# Patient Record
Sex: FEMALE | Race: WHITE | NOT HISPANIC OR LATINO | Employment: OTHER | URBAN - METROPOLITAN AREA
[De-identification: names, ages, dates, MRNs, and addresses within clinical notes are randomized per-mention and may not be internally consistent; named-entity substitution may affect disease eponyms.]

---

## 2017-02-20 ENCOUNTER — OFFICE VISIT (OUTPATIENT)
Dept: INTERNAL MEDICINE | Facility: CLINIC | Age: 82
End: 2017-02-20
Payer: COMMERCIAL

## 2017-02-20 VITALS
SYSTOLIC BLOOD PRESSURE: 146 MMHG | HEIGHT: 60 IN | HEART RATE: 80 BPM | WEIGHT: 121 LBS | DIASTOLIC BLOOD PRESSURE: 82 MMHG | TEMPERATURE: 96.9 F | BODY MASS INDEX: 23.75 KG/M2 | OXYGEN SATURATION: 97 %

## 2017-02-20 DIAGNOSIS — E78.5 HYPERLIPIDEMIA LDL GOAL <130: ICD-10-CM

## 2017-02-20 DIAGNOSIS — I10 HYPERTENSION, GOAL BELOW 150/90: Primary | ICD-10-CM

## 2017-02-20 PROCEDURE — 99213 OFFICE O/P EST LOW 20 MIN: CPT | Performed by: NURSE PRACTITIONER

## 2017-02-20 RX ORDER — HYDROCHLOROTHIAZIDE 25 MG/1
12.5 TABLET ORAL EVERY MORNING
Qty: 90 TABLET | Refills: 3 | Status: SHIPPED
Start: 2017-02-20 | End: 2017-08-25

## 2017-02-20 RX ORDER — LISINOPRIL 5 MG/1
5 TABLET ORAL DAILY
Qty: 90 TABLET | Refills: 3 | Status: SHIPPED
Start: 2017-02-20 | End: 2017-07-25 | Stop reason: DRUGHIGH

## 2017-02-20 RX ORDER — PRAVASTATIN SODIUM 20 MG
20 TABLET ORAL DAILY
Qty: 90 TABLET | Refills: 3 | Status: SHIPPED
Start: 2017-02-20 | End: 2018-03-08

## 2017-02-20 ASSESSMENT — PAIN SCALES - GENERAL: PAINLEVEL: NO PAIN (0)

## 2017-02-20 NOTE — PROGRESS NOTES
"  SUBJECTIVE:                                                    Marisol Steele is a 89 year old female who presents to clinic today for the following health issues:    Medication Followup of  Blood Pressure medications    Taking Medication as prescribed: yes    Side Effects:  None    Medication Helping Symptoms:  yes     * Patient needs refill son her blood pressure medications.    *Patient would also like to establish care with you.    Lives independently in home in [place]. Feels safe at home.   Children live near by and are helpful  She cooks her own meals. Love doing yardwork  Last saw Dr. Joyner 07/2016 for annual exam. Labs stable  Talks about arthritic changes in her hands. \"Not really painful\"  Occasionally takes an aspirin. It is not bothersome to her    Reviewed family history  Hx hypertension. 1 sister had HTN and passed away from stroke  She does not check her BP at home  Denies headache, dizziness, nausea  Denies lightheadedness and symptoms of hypoTN      Problem list and histories reviewed & adjusted, as indicated.  Additional history: none    Patient Active Problem List   Diagnosis     Hyperlipidemia LDL goal <130     Advanced directives, counseling/discussion     PVD (posterior vitreous detachment), left eye     Hypertension goal BP (blood pressure) < 140/90     Pseudophakia,ou     Bilateral dry eyes     Past Surgical History   Procedure Laterality Date     Appendectomy open       Phacoemulsification with standard intraocular lens implant Right 6/29/2015     Procedure: PHACOEMULSIFICATION WITH STANDARD INTRAOCULAR LENS IMPLANT;  Surgeon: Lisette Lazaro MD;  Location: [facility]     Phacoemulsification with standard intraocular lens implant Left 8/3/2015     Procedure: PHACOEMULSIFICATION WITH STANDARD INTRAOCULAR LENS IMPLANT;  Surgeon: Lisette Lazaro MD;  Location: [facility] OR     Cataract iol, rt/lt         Social History   Substance Use Topics     Smoking status: Former Smoker     Types: " Cigarettes     Quit date: 7/6/1990     Smokeless tobacco: Never Used     Alcohol use Yes      Comment: occ wine     Family History   Problem Relation Age of Onset     Hypertension Mother      Glaucoma Mother      HEART DISEASE Father      CANCER Paternal Grandmother      HEART DISEASE Paternal Grandfather      CEREBROVASCULAR DISEASE Sister      Alcohol/Drug Son      Alcohol/Drug Sister      Respiratory Sister      Genitourinary Problems Sister      CEREBROVASCULAR DISEASE Son      Macular Degeneration No family hx of          Current Outpatient Prescriptions   Medication Sig Dispense Refill     pravastatin (PRAVACHOL) 20 MG tablet Take 1 tablet (20 mg) by mouth daily 90 tablet 3     lisinopril (PRINIVIL/ZESTRIL) 5 MG tablet Take 1 tablet (5 mg) by mouth daily 90 tablet 3     hydrochlorothiazide (HYDRODIURIL) 25 MG tablet Take 0.5 tablets (12.5 mg) by mouth every morning 90 tablet 3     ASPIRIN 81 MG PO TBEC 1 TABLET DAILY       MULTI-VITAMIN PO TABS 1 TABLET DAILY       [DISCONTINUED] pravastatin (PRAVACHOL) 20 MG tablet Take 1 tablet (20 mg) by mouth daily 90 tablet 3     [DISCONTINUED] lisinopril (PRINIVIL,ZESTRIL) 5 MG tablet Take 1 tablet (5 mg) by mouth daily 90 tablet 3     [DISCONTINUED] hydrochlorothiazide (HYDRODIURIL) 25 MG tablet Take 0.5 tablets (12.5 mg) by mouth every morning 90 tablet 3     BP Readings from Last 3 Encounters:   02/20/17 146/82   07/25/16 134/86   08/03/15 150/86    Wt Readings from Last 3 Encounters:   02/20/17 121 lb (54.9 kg)   07/25/16 121 lb (54.9 kg)   07/29/15 116 lb (52.6 kg)                  Problem list, Medication list, Allergies, and Medical/Social/Surgical histories reviewed in [facility] and updated as appropriate.    ROS:  Constitutional, HEENT, cardiovascular, pulmonary, gi and gu systems are negative, except as otherwise noted.    OBJECTIVE:                                                    /82  Pulse 80  Temp 96.9  F (36.1  C) (Oral)  Ht 5' (1.524 m)  Wt 121 lb  (54.9 kg)  SpO2 97%  BMI 23.63 kg/m2  Body mass index is 23.63 kg/(m^2).  GENERAL: healthy, alert and no distress  RESP: lungs clear to auscultation - no rales, rhonchi or wheezes  CV: regular rate and rhythm, normal S1 S2, no S3 or S4, no murmur, click or rub, no peripheral edema and peripheral pulses strong  PSYCH: mentation appears normal, affect normal/bright    Diagnostic Test Results:  none      ASSESSMENT/PLAN:                                                        ICD-10-CM    1. Hypertension, goal below 150/90 I10 lisinopril (PRINIVIL/ZESTRIL) 5 MG tablet     hydrochlorothiazide (HYDRODIURIL) 25 MG tablet   2. Hyperlipidemia LDL goal <130 E78.5 pravastatin (PRAVACHOL) 20 MG tablet       We discussed BP control. In 89 year old female w/o DM and CKD, recommend BP < 150/90. She is near this threshold. Reviewed past vitals which shows several elevated readings. We discussed the risk and benefits of increasing lisinopril to maintain tighter control of BP. Greatest risk being hypotension. She would like to stay on current dosages (given BP within goal). We will readdress this at her annual exam in July. May consider increasing lisinopril at that time.     JASPER Quintana [facility]

## 2017-02-20 NOTE — MR AVS SNAPSHOT
"              After Visit Summary   2/20/2017    Marisol Steele    MRN: [id number]           Patient Information     Date Of Birth          8/8/1926        Visit Information        Provider Department      2/20/2017 8:20 AM Gem Cabello APRN CNP [facility]        Today's Diagnoses     Hyperlipidemia LDL goal <130        Essential hypertension with goal blood pressure less than 140/90          Care Instructions    See you in the summer for your annual exam. Come fasting so we can check your cholesterol.             Follow-ups after your visit        Who to contact     If you have questions or need follow up information about today's clinic visit or your schedule please contact [facility] directly at [phone].  Normal or non-critical lab and imaging results will be communicated to you by MyChart, letter or phone within 4 business days after the clinic has received the results. If you do not hear from us within 7 days, please contact the clinic through MyChart or phone. If you have a critical or abnormal lab result, we will notify you by phone as soon as possible.  Submit refill requests through Simple Tithe or call your pharmacy and they will forward the refill request to us. Please allow 3 business days for your refill to be completed.          Additional Information About Your Visit        MyChart Information     Simple Tithe lets you send messages to your doctor, view your test results, renew your prescriptions, schedule appointments and more. To sign up, go to [email or web address] . Click on \"Log in\" on the left side of the screen, which will take you to the Welcome page. Then click on \"Sign up Now\" on the right side of the page.     You will be asked to enter the access code listed below, as well as some personal information. Please follow the directions to create your username and password.     Your access code is: [email or web address]  Expires: 5/21/2017  8:45 AM     Your " access code will  in 90 days. If you need help or a new code, please call your [place] clinic or [phone].        Care EveryWhere ID     This is your Care EveryWhere ID. This could be used by other organizations to access your [place] medical records  [id number]        Your Vitals Were     Pulse Temperature Height Pulse Oximetry BMI (Body Mass Index)       80 96.9  F (36.1  C) (Oral) 5' (1.524 m) 97% 23.63 kg/m2        Blood Pressure from Last 3 Encounters:   17 146/82   16 134/86   08/03/15 150/86    Weight from Last 3 Encounters:   17 121 lb (54.9 kg)   16 121 lb (54.9 kg)   07/29/15 116 lb (52.6 kg)              Today, you had the following     No orders found for display         Where to get your medicines      These medications were sent to [facility] YAQUELIN TREVINO  [address]  [address] [place] [address]     Phone:  [phone]     hydrochlorothiazide 25 MG tablet    lisinopril 5 MG tablet    pravastatin 20 MG tablet          Primary Care Provider Office Phone # Fax #    Pedro Joyner [phone]       [address] [facility] [place] [address]        Thank you!     Thank you for choosing [facility]  for your care. Our goal is always to provide you with excellent care. Hearing back from our patients is one way we can continue to improve our services. Please take a few minutes to complete the written survey that you may receive in the mail after your visit with us. Thank you!             Your Updated Medication List - Protect others around you: Learn how to safely use, store and throw away your medicines at www.disposemymeds.org.          This list is accurate as of: 17  8:46 AM.  Always use your most recent med list.                   Brand Name Dispense Instructions for use    aspirin 81 MG EC tablet      1 TABLET DAILY       hydrochlorothiazide 25 MG  tablet    HYDRODIURIL    90 tablet    Take 0.5 tablets (12.5 mg) by mouth every morning       lisinopril 5 MG tablet    PRINIVIL/ZESTRIL    90 tablet    Take 1 tablet (5 mg) by mouth daily       Multi-vitamin Tabs tablet   Generic drug:  multivitamin, therapeutic with minerals      1 TABLET DAILY       pravastatin 20 MG tablet    PRAVACHOL    90 tablet    Take 1 tablet (20 mg) by mouth daily

## 2017-02-20 NOTE — NURSING NOTE
Chief Complaint   Patient presents with     Recheck Medication     Establish Care       Initial /81 (BP Location: Right arm, Patient Position: Chair, Cuff Size: Adult Regular)  Pulse 80  Temp 96.9  F (36.1  C) (Oral)  Ht 5' (1.524 m)  Wt 121 lb (54.9 kg)  SpO2 97%  BMI 23.63 kg/m2 Estimated body mass index is 23.63 kg/(m^2) as calculated from the following:    Height as of this encounter: 5' (1.524 m).    Weight as of this encounter: 121 lb (54.9 kg).  Medication Reconciliation: complete   Fariha Maciel MA

## 2017-07-25 ENCOUNTER — OFFICE VISIT (OUTPATIENT)
Dept: FAMILY MEDICINE | Facility: CLINIC | Age: 82
End: 2017-07-25
Payer: COMMERCIAL

## 2017-07-25 VITALS
HEART RATE: 84 BPM | TEMPERATURE: 97.3 F | OXYGEN SATURATION: 98 % | SYSTOLIC BLOOD PRESSURE: 148 MMHG | BODY MASS INDEX: 20.9 KG/M2 | DIASTOLIC BLOOD PRESSURE: 68 MMHG | WEIGHT: 107 LBS

## 2017-07-25 DIAGNOSIS — E78.5 HYPERLIPIDEMIA LDL GOAL <130: ICD-10-CM

## 2017-07-25 DIAGNOSIS — I10 HYPERTENSION, GOAL BELOW 150/90: Primary | ICD-10-CM

## 2017-07-25 LAB
ANION GAP SERPL CALCULATED.3IONS-SCNC: 8 MMOL/L (ref 3–14)
BUN SERPL-MCNC: 11 MG/DL (ref 7–30)
CALCIUM SERPL-MCNC: 9.6 MG/DL (ref 8.5–10.1)
CHLORIDE SERPL-SCNC: 104 MMOL/L (ref 94–109)
CHOLEST SERPL-MCNC: 194 MG/DL
CO2 SERPL-SCNC: 27 MMOL/L (ref 20–32)
CREAT SERPL-MCNC: 0.76 MG/DL (ref 0.52–1.04)
GFR SERPL CREATININE-BSD FRML MDRD: 71 ML/MIN/1.7M2
GLUCOSE SERPL-MCNC: 80 MG/DL (ref 70–99)
HDLC SERPL-MCNC: 74 MG/DL
LDLC SERPL CALC-MCNC: 97 MG/DL
NONHDLC SERPL-MCNC: 120 MG/DL
POTASSIUM SERPL-SCNC: 3.5 MMOL/L (ref 3.4–5.3)
SODIUM SERPL-SCNC: 139 MMOL/L (ref 133–144)
TRIGL SERPL-MCNC: 117 MG/DL

## 2017-07-25 PROCEDURE — 80048 BASIC METABOLIC PNL TOTAL CA: CPT | Performed by: NURSE PRACTITIONER

## 2017-07-25 PROCEDURE — 80061 LIPID PANEL: CPT | Performed by: NURSE PRACTITIONER

## 2017-07-25 PROCEDURE — 36415 COLL VENOUS BLD VENIPUNCTURE: CPT | Performed by: NURSE PRACTITIONER

## 2017-07-25 PROCEDURE — 99213 OFFICE O/P EST LOW 20 MIN: CPT | Performed by: NURSE PRACTITIONER

## 2017-07-25 RX ORDER — LISINOPRIL 10 MG/1
10 TABLET ORAL DAILY
Qty: 90 TABLET | Refills: 3 | Status: SHIPPED | OUTPATIENT
Start: 2017-07-25 | End: 2017-08-01 | Stop reason: DRUGHIGH

## 2017-07-25 ASSESSMENT — PAIN SCALES - GENERAL: PAINLEVEL: NO PAIN (0)

## 2017-07-25 NOTE — PATIENT INSTRUCTIONS
I increased your lisinopril 10 mg daily. You can take 2 of your 5 mg tablets until your bottle is empty. Then call your [facility] pharmacy to  your new prescription.     Follow up with the nurse in 1 week

## 2017-07-25 NOTE — LETTER
[facility]  [address] [place]  [address]  [phone]        July 27, 2017    Marisol Steele  [address]  [place] [address]        Dear Marisol,    The results of your recent labs are enclosed.  Your labs look great!  I hope your blood pressure is improved with the increase in dosage. I'm glad you have an appointment with the nurse for a recheck next week.     Please call the clinic if you have any concerns.    Results for orders placed or performed in visit on 07/25/17   Basic metabolic panel   Result Value Ref Range    Sodium 139 133 - 144 mmol/L    Potassium 3.5 3.4 - 5.3 mmol/L    Chloride 104 94 - 109 mmol/L    Carbon Dioxide 27 20 - 32 mmol/L    Anion Gap 8 3 - 14 mmol/L    Glucose 80 70 - 99 mg/dL    Urea Nitrogen 11 7 - 30 mg/dL    Creatinine 0.76 0.52 - 1.04 mg/dL    GFR Estimate 71 >60 mL/min/1.7m2    GFR Estimate If Black 86 >60 mL/min/1.7m2    Calcium 9.6 8.5 - 10.1 mg/dL   Lipid Profile (Chol, Trig, HDL, LDL calc)   Result Value Ref Range    Cholesterol 194 <200 mg/dL    Triglycerides 117 <150 mg/dL    HDL Cholesterol 74 >49 mg/dL    LDL Cholesterol Calculated 97 <100 mg/dL    Non HDL Cholesterol 120 <130 mg/dL   If you have any questions please call the clinic at [phone].    Sincerely,    Gem HUTCHINSON CNP  LMD

## 2017-07-25 NOTE — NURSING NOTE
Chief Complaint   Patient presents with     Hypertension       Initial /89 (BP Location: Right arm, Patient Position: Chair, Cuff Size: Adult Small)  Pulse 84  Temp 97.3  F (36.3  C) (Oral)  Wt 107 lb (48.5 kg)  SpO2 98%  Breastfeeding? No  BMI 20.9 kg/m2 Estimated body mass index is 20.9 kg/(m^2) as calculated from the following:    Height as of 2/20/17: 5' (1.524 m).    Weight as of this encounter: 107 lb (48.5 kg).  Medication Reconciliation: complete.  SUHA Olguin MA

## 2017-07-25 NOTE — MR AVS SNAPSHOT
After Visit Summary   7/25/2017    Marisol Steele    MRN: [id number]           Patient Information     Date Of Birth          8/8/1926        Visit Information        Provider Department      7/25/2017 11:40 AM Gem Cabello APRN CNP [facility]        Today's Diagnoses     Hypertension, goal below 150/90    -  1    Hyperlipidemia LDL goal <130          Care Instructions    I increased your lisinopril 10 mg daily. You can take 2 of your 5 mg tablets until your bottle is empty. Then call your [facility] pharmacy to  your new prescription.     Follow up with the nurse in 1 week          Follow-ups after your visit        Your next 10 appointments already scheduled     Aug 01, 2017  9:00 AM CDT   Nurse Only with CP RN   [facility] ([facility])    [address] [place] [address] [phone]           [facility] need to be scheduled for 60 mins              Who to contact     If you have questions or need follow up information about today's clinic visit or your schedule please contact [facility] directly at [phone].  Normal or non-critical lab and imaging results will be communicated to you by Qubellhart, letter or phone within 4 business days after the clinic has received the results. If you do not hear from us within 7 days, please contact the clinic through Qubellhart or phone. If you have a critical or abnormal lab result, we will notify you by phone as soon as possible.  Submit refill requests through Postmaster or call your pharmacy and they will forward the refill request to us. Please allow 3 business days for your refill to be completed.          Additional Information About Your Visit        QubellharMaternova Information     Postmaster lets you send messages to your doctor, view your test results, renew your prescriptions, schedule appointments and more. To sign up, go to  "[email or web address] . Click on \"Log in\" on the left side of the screen, which will take you to the Welcome page. Then click on \"Sign up Now\" on the right side of the page.     You will be asked to enter the access code listed below, as well as some personal information. Please follow the directions to create your username and password.     Your access code is: [email or web address]  Expires: 10/23/2017 12:05 PM     Your access code will  in 90 days. If you need help or a new code, please call your [place] clinic or [phone].        Care EveryWhere ID     This is your Care EveryWhere ID. This could be used by other organizations to access your [place] medical records  [id number]        Your Vitals Were     Pulse Temperature Pulse Oximetry Breastfeeding? BMI (Body Mass Index)       84 97.3  F (36.3  C) (Oral) 98% No 20.9 kg/m2        Blood Pressure from Last 3 Encounters:   17 148/68   17 146/82   16 134/86    Weight from Last 3 Encounters:   17 107 lb (48.5 kg)   17 121 lb (54.9 kg)   16 121 lb (54.9 kg)              We Performed the Following     Basic metabolic panel     Lipid Profile (Chol, Trig, HDL, LDL calc)          Today's Medication Changes          These changes are accurate as of: 17 12:05 PM.  If you have any questions, ask your nurse or doctor.               These medicines have changed or have updated prescriptions.        Dose/Directions    lisinopril 10 MG tablet   Commonly known as:  PRINIVIL/ZESTRIL   This may have changed:    - medication strength  - how much to take   Used for:  Hypertension, goal below 150/90   Changed by:  Gem Cabello APRN CNP        Dose:  10 mg   Take 1 tablet (10 mg) by mouth daily   Quantity:  90 tablet   Refills:  3            Where to get your medicines      These medications were sent to [facility] YAQUELIN TREVION  [address]  [address] [address]     Phone:  " [phone]     lisinopril 10 MG tablet                Primary Care Provider Office Phone # Fax #    Pedro Joyner [phone]       [facility] [address] [facility] [address]        Equal Access to Services     KADIE RACHEL : Hadii aad ku hadasho Soomaali, waaxda luqadaha, qaybta kaalmada adeegyada, waxay idiin hayaan adetomi jenkins lakimberly whitaker. So [facility] [phone].    ATENCIÓN: Si habla español, tiene a phan disposición servicios gratuitos de asistencia lingüística. Llame al [phone].    We comply with applicable federal civil rights laws and [place] laws. We do not discriminate on the basis of race, color, national origin, age, disability sex, sexual orientation or gender identity.            Thank you!     Thank you for choosing [facility]  for your care. Our goal is always to provide you with excellent care. Hearing back from our patients is one way we can continue to improve our services. Please take a few minutes to complete the written survey that you may receive in the mail after your visit with us. Thank you!             Your Updated Medication List - Protect others around you: Learn how to safely use, store and throw away your medicines at www.disposemymeds.org.          This list is accurate as of: 7/25/17 12:05 PM.  Always use your most recent med list.                   Brand Name Dispense Instructions for use Diagnosis    aspirin 81 MG EC tablet      1 TABLET DAILY        hydrochlorothiazide 25 MG tablet    HYDRODIURIL    90 tablet    Take 0.5 tablets (12.5 mg) by mouth every morning    Hypertension, goal below 150/90       lisinopril 10 MG tablet    PRINIVIL/ZESTRIL    90 tablet    Take 1 tablet (10 mg) by mouth daily    Hypertension, goal below 150/90       Multi-vitamin Tabs tablet   Generic drug:  multivitamin, therapeutic with minerals      1 TABLET DAILY        pravastatin 20 MG tablet    PRAVACHOL    90 tablet    Take 1  tablet (20 mg) by mouth daily    Hyperlipidemia LDL goal <130

## 2017-07-25 NOTE — PROGRESS NOTES
SUBJECTIVE:                                                    Marisol Steele is a 89 year old female who presents to clinic today for the following health issues:      Hypertension Follow-up      Outpatient blood pressures are not being checked.    Low Salt Diet: low salt        Amount of exercise or physical activity: 2-3 days/week for an average of 15-30 minutes    Problems taking medications regularly: No    Medication side effects: none  Diet: low salt and carbohydrate counting    She does not check her BP at home  Denies headache, dizziness, nausea  Denies lightheadedness and symptoms of hypoTN    Discussed DEXA scan which she continues to decline      Problem list and histories reviewed & adjusted, as indicated.  Additional history: none    Patient Active Problem List   Diagnosis     Hyperlipidemia LDL goal <130     Advanced directives, counseling/discussion     PVD (posterior vitreous detachment), left eye     Hypertension, goal below 150/90     Pseudophakia,ou     Bilateral dry eyes     Past Surgical History:   Procedure Laterality Date     APPENDECTOMY OPEN       CATARACT IOL, RT/LT       PHACOEMULSIFICATION WITH STANDARD INTRAOCULAR LENS IMPLANT Right 6/29/2015    Procedure: PHACOEMULSIFICATION WITH STANDARD INTRAOCULAR LENS IMPLANT;  Surgeon: Lisette Lazaro MD;  Location: [facility]     PHACOEMULSIFICATION WITH STANDARD INTRAOCULAR LENS IMPLANT Left 8/3/2015    Procedure: PHACOEMULSIFICATION WITH STANDARD INTRAOCULAR LENS IMPLANT;  Surgeon: Lisette Lazaro MD;  Location: [facility] OR       Social History   Substance Use Topics     Smoking status: Former Smoker     Types: Cigarettes     Quit date: 7/6/1990     Smokeless tobacco: Never Used     Alcohol use Yes      Comment: occ wine     Family History   Problem Relation Age of Onset     Hypertension Mother      Glaucoma Mother      HEART DISEASE Father      CANCER Paternal Grandmother      HEART DISEASE Paternal Grandfather      CEREBROVASCULAR DISEASE  Sister      Alcohol/Drug Son      Alcohol/Drug Sister      Respiratory Sister      Genitourinary Problems Sister      CEREBROVASCULAR DISEASE Son      Hypertension Sister      CEREBROVASCULAR DISEASE Sister      Macular Degeneration No family hx of          BP Readings from Last 3 Encounters:   07/25/17 148/68   02/20/17 146/82   07/25/16 134/86    Wt Readings from Last 3 Encounters:   07/25/17 107 lb (48.5 kg)   02/20/17 121 lb (54.9 kg)   07/25/16 121 lb (54.9 kg)                        Reviewed and updated as needed this visit by clinical staffTobacco  Allergies  Meds  Med Hx  Surg Hx  Fam Hx  Soc Hx      Reviewed and updated as needed this visit by Provider         ROS:  Constitutional, HEENT, cardiovascular, pulmonary, gi and gu systems are negative, except as otherwise noted.      OBJECTIVE:   /68  Pulse 84  Temp 97.3  F (36.3  C) (Oral)  Wt 107 lb (48.5 kg)  SpO2 98%  Breastfeeding? No  BMI 20.9 kg/m2  Body mass index is 20.9 kg/(m^2).  GENERAL: healthy, alert and no distress  RESP: lungs clear to auscultation - no rales, rhonchi or wheezes  CV: regular rate and rhythm, normal S1 S2, no S3 or S4, no murmur, click or rub, no peripheral edema and peripheral pulses strong  ABDOMEN: soft, nontender, no hepatosplenomegaly, no masses and bowel sounds normal    Diagnostic Test Results:  none   BP Readings from Last 3 Encounters:   07/25/17 148/68   02/20/17 146/82   07/25/16 134/86       ASSESSMENT/PLAN:       ICD-10-CM    1. Hypertension, goal below 150/90 I10 Basic metabolic panel     lisinopril (PRINIVIL/ZESTRIL) 10 MG tablet   2. Hyperlipidemia LDL goal <130 E78.5 Lipid Profile (Chol, Trig, HDL, LDL calc)     BP improved upon recheck, but given first 2 readings above goal 150/90, especially first SBP in 180s, recommend stricter BP control  Cautious about causing hypotension  Follow up in 1 week    Patient Instructions   I increased your lisinopril 10 mg daily. You can take 2 of your 5 mg  tablets until your bottle is empty. Then call your [facility] pharmacy to  your new prescription.     Follow up with the nurse in 1 week      JASPER Quintana [facility]

## 2017-07-27 NOTE — PROGRESS NOTES
[address] [address]  Phone: [phone]  Fax: [phone]      07/27/17    Marisol Steele  [address] [place] [address]  [place] [address]      Dear Marisol,    The results of your recent labs are enclosed.  Your labs look great!  I hope your blood pressure is improved with the increase in dosage. I'm glad you have an appointment with the nurse for a recheck next week.     Please call the clinic if you have any concerns.    Sincerely,    JASPER Quintana CNP    Your [facility] Care Team

## 2017-08-01 ENCOUNTER — ALLIED HEALTH/NURSE VISIT (OUTPATIENT)
Dept: NURSING | Facility: CLINIC | Age: 82
End: 2017-08-01
Payer: COMMERCIAL

## 2017-08-01 ENCOUNTER — TELEPHONE (OUTPATIENT)
Dept: FAMILY MEDICINE | Facility: CLINIC | Age: 82
End: 2017-08-01

## 2017-08-01 VITALS
WEIGHT: 109 LBS | BODY MASS INDEX: 21.29 KG/M2 | SYSTOLIC BLOOD PRESSURE: 168 MMHG | DIASTOLIC BLOOD PRESSURE: 88 MMHG | HEART RATE: 68 BPM

## 2017-08-01 DIAGNOSIS — I10 HYPERTENSION, GOAL BELOW 150/90: Primary | ICD-10-CM

## 2017-08-01 PROCEDURE — 99207 ZZC NO CHARGE NURSE ONLY: CPT

## 2017-08-01 RX ORDER — LISINOPRIL 20 MG/1
20 TABLET ORAL DAILY
Qty: 30 TABLET | Refills: 1 | Status: SHIPPED | OUTPATIENT
Start: 2017-08-01 | End: 2017-08-10

## 2017-08-01 NOTE — PROGRESS NOTES
Indications for Blood Pressure monitoring: initially elevated at office visit a week ago, increased lisinopril dose a week ago    Questioned patient about current smoking habits.  Pt. quit smoking some time ago.     Signs and Symptoms:   Headaches: No  Chest pain: No  Shortness of breath: No  Edema: No  Visual problems: No  Parathesia: No  Epitaxis: No  Dizziness: No    Patient does not check BP at home, denies she knows anyone with home BP cuff.   States she does feel herself get anxious before coming into clinic.      BP:   BP Readings from Last 1 Encounters:   08/01/17 168/88     68     Diabetic: No  Heart Disease:  No    Phone encounter routed to Gem Cabello to advise on any changes or plan.    Zaira Hayes RN  [facility]

## 2017-08-01 NOTE — MR AVS SNAPSHOT
"              After Visit Summary   8/1/2017    Marisol Steele    MRN: [id number]           Patient Information     Date Of Birth          8/8/1926        Visit Information        Provider Department      8/1/2017 9:00 AM CP RN [facility]        Care Instructions    Gem's team will let you know if she wants to adjust anything or see you again regarding your blood pressure.   Call if you have concerns or develop any symptoms such as dizziness, excessive fatigue, shortness of breath, blurry vision.    Have a happy birthday!          Follow-ups after your visit        Who to contact     If you have questions or need follow up information about today's clinic visit or your schedule please contact [facility] directly at [phone].  Normal or non-critical lab and imaging results will be communicated to you by MyChart, letter or phone within 4 business days after the clinic has received the results. If you do not hear from us within 7 days, please contact the clinic through [employer] or phone. If you have a critical or abnormal lab result, we will notify you by phone as soon as possible.  Submit refill requests through [employer] or call your pharmacy and they will forward the refill request to us. Please allow 3 business days for your refill to be completed.          Additional Information About Your Visit        MyChart Information     [employer] lets you send messages to your doctor, view your test results, renew your prescriptions, schedule appointments and more. To sign up, go to [email or web address] . Click on \"Log in\" on the left side of the screen, which will take you to the Welcome page. Then click on \"Sign up Now\" on the right side of the page.     You will be asked to enter the access code listed below, as well as some personal information. Please follow the directions to create your username and password.     Your access code is: [email or web address]  Expires: 10/23/2017 " 12:05 PM     Your access code will  in 90 days. If you need help or a new code, please call your [place] clinic or [phone].        Care EveryWhere ID     This is your Care EveryWhere ID. This could be used by other organizations to access your [place] medical records  [id number]        Your Vitals Were     Pulse BMI (Body Mass Index)                68 21.29 kg/m2           Blood Pressure from Last 3 Encounters:   17 168/88   17 148/68   17 146/82    Weight from Last 3 Encounters:   17 109 lb (49.4 kg)   17 107 lb (48.5 kg)   17 121 lb (54.9 kg)              Today, you had the following     No orders found for display       Primary Care Provider Office Phone # Fax JASPER Suarez [facility] [phone]       [facility] [address]  [facility] [address]        Equal Access to Services     KADIE RACHEL : Hadii aad ku hadasho Soomaali, waaxda luqadaha, qaybta kaalmada adeegyada, waxay idiin hayaan adeeg kharaduarte blanco . So [facility] [phone].    ATENCIÓN: Si habla español, tiene a phan disposición servicios gratuitos de asistencia lingüística. Llame al [phone].    We comply with applicable federal civil rights laws and [place] laws. We do not discriminate on the basis of race, color, national origin, age, disability sex, sexual orientation or gender identity.            Thank you!     Thank you for choosing [facility]  for your care. Our goal is always to provide you with excellent care. Hearing back from our patients is one way we can continue to improve our services. Please take a few minutes to complete the written survey that you may receive in the mail after your visit with us. Thank you!             Your Updated Medication List - Protect others around you: Learn how to safely use, store and throw away your medicines at www.disposemymeds.org.          This list is accurate as  of: 8/1/17  9:17 AM.  Always use your most recent med list.                   Brand Name Dispense Instructions for use Diagnosis    aspirin 81 MG EC tablet      1 TABLET DAILY        hydrochlorothiazide 25 MG tablet    HYDRODIURIL    90 tablet    Take 0.5 tablets (12.5 mg) by mouth every morning    Hypertension, goal below 150/90       lisinopril 10 MG tablet    PRINIVIL/ZESTRIL    90 tablet    Take 1 tablet (10 mg) by mouth daily    Hypertension, goal below 150/90       Multi-vitamin Tabs tablet   Generic drug:  multivitamin, therapeutic with minerals      1 TABLET DAILY        pravastatin 20 MG tablet    PRAVACHOL    90 tablet    Take 1 tablet (20 mg) by mouth daily    Hyperlipidemia LDL goal <130

## 2017-08-01 NOTE — TELEPHONE ENCOUNTER
I increased her lisinopril to 20 mg. I sent the prescription to [facility] in [place]. She will need to call when she wants it filled. If she still has 5 mg tablets remaining, she can take 4 at a time. If she is using the 10 mg tablets, she can 2 tablets at a time. Total dose should be 20 mg once daily.   Have her return for BP and lab visit in 1 week. Make sure she can sit and rest for 5 minutes before checking blood pressure and check manually. I am cautious about causing hypotension.   She can continue to see the nurse, or can schedule a visit with me.

## 2017-08-01 NOTE — PATIENT INSTRUCTIONS
Gem's team will let you know if she wants to adjust anything or see you again regarding your blood pressure.   Call if you have concerns or develop any symptoms such as dizziness, excessive fatigue, shortness of breath, blurry vision.    Have a happy birthday!

## 2017-08-01 NOTE — TELEPHONE ENCOUNTER
Patient was seen today for RN BP check; lisinopril dose increased a week ago.  States she took her lisinopril and hctz about 2 hours ago today.    Denies any symptoms.  Checked BP several times at today's visit.    BP Readings from Last 3 Encounters:   08/01/17 168/88   07/25/17 148/68   02/20/17 146/82     Patient does not check BP at home, denies she knows anyone with home BP cuff.   States she does feel herself get anxious before coming into clinic.    Routed to Gem Cabello to address any change to meds or plan at this point.    Zaira Hayes RN  [facility]

## 2017-08-01 NOTE — TELEPHONE ENCOUNTER
Called and spoke with patient, advised of message as below.  She currently has the 5mg at home, she will continue that (taking 4 pills for a total of 20mg) until she runs out.  Scheduled BP recheck and labs next week 8/8/17 at 130pm.    Norah Jansen RN  [facility]

## 2017-08-08 ENCOUNTER — TELEPHONE (OUTPATIENT)
Dept: FAMILY MEDICINE | Facility: CLINIC | Age: 82
End: 2017-08-08

## 2017-08-08 ENCOUNTER — ALLIED HEALTH/NURSE VISIT (OUTPATIENT)
Dept: NURSING | Facility: CLINIC | Age: 82
End: 2017-08-08
Payer: COMMERCIAL

## 2017-08-08 VITALS
BODY MASS INDEX: 21.09 KG/M2 | DIASTOLIC BLOOD PRESSURE: 87 MMHG | SYSTOLIC BLOOD PRESSURE: 175 MMHG | HEART RATE: 73 BPM | WEIGHT: 108 LBS

## 2017-08-08 DIAGNOSIS — I10 HYPERTENSION, GOAL BELOW 150/90: ICD-10-CM

## 2017-08-08 LAB
ANION GAP SERPL CALCULATED.3IONS-SCNC: 7 MMOL/L (ref 3–14)
BUN SERPL-MCNC: 14 MG/DL (ref 7–30)
CALCIUM SERPL-MCNC: 9.3 MG/DL (ref 8.5–10.1)
CHLORIDE SERPL-SCNC: 101 MMOL/L (ref 94–109)
CO2 SERPL-SCNC: 27 MMOL/L (ref 20–32)
CREAT SERPL-MCNC: 0.82 MG/DL (ref 0.52–1.04)
GFR SERPL CREATININE-BSD FRML MDRD: 65 ML/MIN/1.7M2
GLUCOSE SERPL-MCNC: 83 MG/DL (ref 70–99)
POTASSIUM SERPL-SCNC: 3.5 MMOL/L (ref 3.4–5.3)
SODIUM SERPL-SCNC: 135 MMOL/L (ref 133–144)

## 2017-08-08 PROCEDURE — 36415 COLL VENOUS BLD VENIPUNCTURE: CPT | Performed by: NURSE PRACTITIONER

## 2017-08-08 PROCEDURE — 99207 ZZC NO CHARGE NURSE ONLY: CPT

## 2017-08-08 PROCEDURE — 80048 BASIC METABOLIC PNL TOTAL CA: CPT | Performed by: NURSE PRACTITIONER

## 2017-08-08 NOTE — TELEPHONE ENCOUNTER
Attempted to call patient at home number, left message on answering service requesting call back to clinic to discuss.  Zaira Hayes RN  [facility]

## 2017-08-08 NOTE — PATIENT INSTRUCTIONS
Gem's team will call you after the blood test result is back tomorrow (Wednesday) or later.    Gem will let you know if she wants to try something different.

## 2017-08-08 NOTE — TELEPHONE ENCOUNTER
"Patient was seen today by RN for BP check, lisinopril dose increased to 20 mg 8/1/17.    BP Readings from Last 3 Encounters:   08/08/17 175/87   08/01/17 168/88   07/25/17 148/68     Patient denies eating a lot of salty food this weekend (she had a birthday party over the weekend, today is her birthday) and states she was taking the 4 tablets of her 5 mg lisinopril up until today when she started the supply of 20 mg lisinopril tablets.   Denies she missed any doses and took all her meds this AM.    Reports very occasional dry cough, not bothersome, not keeping her up at night.  Otherwise no symptoms of high or low BP reported.     She has not checked BP anywhere outside of clinic (such as in a pharmacy).  Today I checked BP twice manually and once with clinic \"tower\" to verify readings, all still elevated/unchanged and actually a bit higher than final reading at last RN visit.    She states she thinks she gets anxious getting BP checked in clinic.  Future order for BMP released, patient to lab.    Routed to Gem Cabello to facilitate follow up regarding lab results and plan for BP.    Zaira Hayes RN  [facility]      "

## 2017-08-08 NOTE — MR AVS SNAPSHOT
"              After Visit Summary   8/8/2017    Marisol Steele    MRN: [id number]           Patient Information     Date Of Birth          8/8/1926        Visit Information        Provider Department      8/8/2017 1:30 PM CP RN [facility]        Today's Diagnoses     Hypertension, goal below 150/90          Care Instructions    Gem's team will call you after the blood test result is back tomorrow (Wednesday) or later.    Gem will let you know if she wants to try something different.          Follow-ups after your visit        Who to contact     If you have questions or need follow up information about today's clinic visit or your schedule please contact [facility] directly at [phone].  Normal or non-critical lab and imaging results will be communicated to you by MyChart, letter or phone within 4 business days after the clinic has received the results. If you do not hear from us within 7 days, please contact the clinic through [employer] or phone. If you have a critical or abnormal lab result, we will notify you by phone as soon as possible.  Submit refill requests through [employer] or call your pharmacy and they will forward the refill request to us. Please allow 3 business days for your refill to be completed.          Additional Information About Your Visit        MyChart Information     [employer] lets you send messages to your doctor, view your test results, renew your prescriptions, schedule appointments and more. To sign up, go to [email or web address] . Click on \"Log in\" on the left side of the screen, which will take you to the Welcome page. Then click on \"Sign up Now\" on the right side of the page.     You will be asked to enter the access code listed below, as well as some personal information. Please follow the directions to create your username and password.     Your access code is: [email or web address]  Expires: 10/23/2017 12:05 PM     Your access code will "  in 90 days. If you need help or a new code, please call your [place] clinic or [phone].        Care EveryWhere ID     This is your Care EveryWhere ID. This could be used by other organizations to access your [place] medical records  [id number]        Your Vitals Were     Pulse BMI (Body Mass Index)                73 21.09 kg/m2           Blood Pressure from Last 3 Encounters:   17 175/87   17 168/88   17 148/68    Weight from Last 3 Encounters:   17 108 lb (49 kg)   17 109 lb (49.4 kg)   17 107 lb (48.5 kg)              We Performed the Following     **Basic metabolic panel FUTURE 2mo        Primary Care Provider Office Phone # Fax #    Gem Llanos JASPER Cabello [phone]       [facility] [address] [facility] [address]        Equal Access to Services     KADIE RACHEL : Hadii aad ku hadasho Soomaali, waaxda luqadaha, qaybta kaalmada adeegyada, waxay idiin hayaan dandreeg innaaraduarte blanco . So [facility] [phone].    ATENCIÓN: Si habla español, tiene a phan disposición servicios gratuitos de asistencia lingüística. Llame al [phone].    We comply with applicable federal civil rights laws and [place] laws. We do not discriminate on the basis of race, color, national origin, age, disability sex, sexual orientation or gender identity.            Thank you!     Thank you for choosing [facility]  for your care. Our goal is always to provide you with excellent care. Hearing back from our patients is one way we can continue to improve our services. Please take a few minutes to complete the written survey that you may receive in the mail after your visit with us. Thank you!             Your Updated Medication List - Protect others around you: Learn how to safely use, store and throw away your medicines at www.disposemymeds.org.          This list is accurate as of: 17  1:37 PM.  Always use  your most recent med list.                   Brand Name Dispense Instructions for use Diagnosis    aspirin 81 MG EC tablet      1 TABLET DAILY        hydrochlorothiazide 25 MG tablet    HYDRODIURIL    90 tablet    Take 0.5 tablets (12.5 mg) by mouth every morning    Hypertension, goal below 150/90       lisinopril 20 MG tablet    PRINIVIL/ZESTRIL    30 tablet    Take 1 tablet (20 mg) by mouth daily    Hypertension, goal below 150/90       Multi-vitamin Tabs tablet   Generic drug:  multivitamin, therapeutic with minerals      1 TABLET DAILY        pravastatin 20 MG tablet    PRAVACHOL    90 tablet    Take 1 tablet (20 mg) by mouth daily    Hyperlipidemia LDL goal <130

## 2017-08-08 NOTE — PROGRESS NOTES
"Indications for Blood Pressure monitoring: elevated, increasing lisinopril    Questioned patient about current smoking habits.  Pt. quit smoking some time ago.     Signs and Symptoms:   Headaches: No  Chest pain: No  Shortness of breath: No  Edema: No  Visual problems: No  Parathesia: No  Epitaxis: No  Dizziness: No    Reports very occasional dry cough, not bothersome, not keeping her up at night.    She has not checked BP anywhere outside of clinic (such as in a pharmacy).   Checked BP twice manually and once with clinic \"tower\" to verify readings, all still elevated/unchanged and actually a bit higher than final reading at last RN visit.    Future order for BMP released, patient to lab.      BP:   BP Readings from Last 1 Encounters:   08/08/17 175/87     73     Diabetic: No  Heart Disease:  No    Patient denies eating a lot of salty food this weekend (she had a birthday party over the weekend, today is her birthday) and states she was taking the 4 tablets of her 5 mg lisinopril up until today when she started the supply of 20 mg lisinopril tablets.   Denies she missed any doses and took all her meds this AM.    Routed phone encounter to Gem Cabello to facilitate follow up on BMP result and plan.    Zaira Hayes RN  [facility]                        "

## 2017-08-09 NOTE — TELEPHONE ENCOUNTER
Patient returned call to RN Triage line, left message to call her back.      Norah Jansen RN  [facility]

## 2017-08-09 NOTE — TELEPHONE ENCOUNTER
RN called patient, scheduled for 8/10 at 1020am with AWILDA.      Norah Jansen RN  [facility]

## 2017-08-10 ENCOUNTER — OFFICE VISIT (OUTPATIENT)
Dept: FAMILY MEDICINE | Facility: CLINIC | Age: 82
End: 2017-08-10
Payer: COMMERCIAL

## 2017-08-10 VITALS
HEART RATE: 87 BPM | WEIGHT: 107 LBS | TEMPERATURE: 97 F | BODY MASS INDEX: 21.01 KG/M2 | HEIGHT: 60 IN | OXYGEN SATURATION: 97 % | SYSTOLIC BLOOD PRESSURE: 172 MMHG | DIASTOLIC BLOOD PRESSURE: 84 MMHG

## 2017-08-10 DIAGNOSIS — I10 HYPERTENSION, GOAL BELOW 150/90: Primary | ICD-10-CM

## 2017-08-10 PROCEDURE — 99213 OFFICE O/P EST LOW 20 MIN: CPT | Performed by: NURSE PRACTITIONER

## 2017-08-10 RX ORDER — AMLODIPINE BESYLATE 2.5 MG/1
2.5 TABLET ORAL DAILY
Qty: 30 TABLET | Refills: 1 | Status: SHIPPED | OUTPATIENT
Start: 2017-08-10 | End: 2017-08-25 | Stop reason: DRUGHIGH

## 2017-08-10 ASSESSMENT — PAIN SCALES - GENERAL: PAINLEVEL: NO PAIN (0)

## 2017-08-10 NOTE — NURSING NOTE
Chief Complaint   Patient presents with     Hypertension       Initial BP (!) 181/98 (BP Location: Right arm, Patient Position: Chair, Cuff Size: Adult Small)  Pulse 87  Temp 97  F (36.1  C) (Oral)  Ht 5' (1.524 m)  Wt 107 lb (48.5 kg)  SpO2 97%  BMI 20.9 kg/m2 Estimated body mass index is 20.9 kg/(m^2) as calculated from the following:    Height as of this encounter: 5' (1.524 m).    Weight as of this encounter: 107 lb (48.5 kg).  Medication Reconciliation: complete   Fariha Maciel MA

## 2017-08-10 NOTE — MR AVS SNAPSHOT
"              After Visit Summary   8/10/2017    Marisol Steele    MRN: [id number]           Patient Information     Date Of Birth          8/8/1926        Visit Information        Provider Department      8/10/2017 10:20 AM Gem Cabello APRN CNP [facility]        Today's Diagnoses     Hypertension, goal below 150/90    -  1      Care Instructions    Stop taking lisinopril. I don't think it's effective for you.  Start taking a new medication, called amlodipine.  Follow up in 1-2 weeks. If you develop edema, or other side effects, call the clinic ([phone])          Follow-ups after your visit        Who to contact     If you have questions or need follow up information about today's clinic visit or your schedule please contact [facility] directly at [phone].  Normal or non-critical lab and imaging results will be communicated to you by MyChart, letter or phone within 4 business days after the clinic has received the results. If you do not hear from us within 7 days, please contact the clinic through MyChart or phone. If you have a critical or abnormal lab result, we will notify you by phone as soon as possible.  Submit refill requests through [employer] or call your pharmacy and they will forward the refill request to us. Please allow 3 business days for your refill to be completed.          Additional Information About Your Visit        MyChart Information     [employer] lets you send messages to your doctor, view your test results, renew your prescriptions, schedule appointments and more. To sign up, go to [email or web address] . Click on \"Log in\" on the left side of the screen, which will take you to the Welcome page. Then click on \"Sign up Now\" on the right side of the page.     You will be asked to enter the access code listed below, as well as some personal information. Please follow the directions to create your username and password.     Your " access code is: [email or web address]  Expires: 10/23/2017 12:05 PM     Your access code will  in 90 days. If you need help or a new code, please call your [place] clinic or [phone].        Care EveryWhere ID     This is your Care EveryWhere ID. This could be used by other organizations to access your [place] medical records  [id number]        Your Vitals Were     Pulse Temperature Height Pulse Oximetry BMI (Body Mass Index)       87 97  F (36.1  C) (Oral) 5' (1.524 m) 97% 20.9 kg/m2        Blood Pressure from Last 3 Encounters:   08/10/17 172/84   17 175/87   17 168/88    Weight from Last 3 Encounters:   08/10/17 107 lb (48.5 kg)   17 108 lb (49 kg)   17 109 lb (49.4 kg)              Today, you had the following     No orders found for display         Today's Medication Changes          These changes are accurate as of: 8/10/17 10:56 AM.  If you have any questions, ask your nurse or doctor.               Start taking these medicines.        Dose/Directions    amLODIPine 2.5 MG tablet   Commonly known as:  NORVASC   Used for:  Hypertension, goal below 150/90   Started by:  Gem Cabello APRN CNP        Dose:  2.5 mg   Take 1 tablet (2.5 mg) by mouth daily   Quantity:  30 tablet   Refills:  1         Stop taking these medicines if you haven't already. Please contact your care team if you have questions.     lisinopril 20 MG tablet   Commonly known as:  PRINIVIL/ZESTRIL   Stopped by:  Gem Cabello APRN CNP                Where to get your medicines      These medications were sent to [facility]  [place] - [address]  [address] [address]     Phone:  [phone]     amLODIPine 2.5 MG tablet                Primary Care Provider Office Phone # Fax #    JASPER Bravo [phone]       [address] [address]        Equal Access to Services     KADIE RACHEL AH: Sangeeta  chantelle Perez, wamalvinda lumichaeladaha, qaybta kaalmada stephane, waxrodo chitra shannanmitch amostomi innaparmjitduarte herreraNixonamaya julianne. So [facility] [phone].    ATENCIÓN: Si habla español, tiene a phan disposición servicios gratuitos de asistencia lingüística. Samaria al [phone].    We comply with applicable federal civil rights laws and [place] laws. We do not discriminate on the basis of race, color, national origin, age, disability sex, sexual orientation or gender identity.            Thank you!     Thank you for choosing [facility]  for your care. Our goal is always to provide you with excellent care. Hearing back from our patients is one way we can continue to improve our services. Please take a few minutes to complete the written survey that you may receive in the mail after your visit with us. Thank you!             Your Updated Medication List - Protect others around you: Learn how to safely use, store and throw away your medicines at www.disposemymeds.org.          This list is accurate as of: 8/10/17 10:56 AM.  Always use your most recent med list.                   Brand Name Dispense Instructions for use Diagnosis    amLODIPine 2.5 MG tablet    NORVASC    30 tablet    Take 1 tablet (2.5 mg) by mouth daily    Hypertension, goal below 150/90       aspirin 81 MG EC tablet           hydrochlorothiazide 25 MG tablet    HYDRODIURIL    90 tablet    Take 0.5 tablets (12.5 mg) by mouth every morning    Hypertension, goal below 150/90       Multi-vitamin Tabs tablet   Generic drug:  multivitamin, therapeutic with minerals      1 TABLET DAILY        pravastatin 20 MG tablet    PRAVACHOL    90 tablet    Take 1 tablet (20 mg) by mouth daily    Hyperlipidemia LDL goal <130

## 2017-08-10 NOTE — PATIENT INSTRUCTIONS
Stop taking lisinopril. I don't think it's effective for you.  Start taking a new medication, called amlodipine.  Follow up in 1-2 weeks. If you develop edema, or other side effects, call the clinic ([phone])

## 2017-08-10 NOTE — PROGRESS NOTES
SUBJECTIVE:                                                    Marisol Steele is a 89 year old female who presents to clinic today for the following health issues:    Hypertension Follow-up      Outpatient blood pressures are not being checked.    Low Salt Diet: low salt        Amount of exercise or physical activity: Outside activities    Problems taking medications regularly: No    Medication side effects: Lisinopril seems to be making her a little woozy and off balance.  Diet: low salt    Fariha Maciel MA    Lisinopril increased to 20 mg at last visit  BP has increased  Occasional lightheadedness since increasing lisinopril  Does not check BP At home  Does not eat a lot of sodium    Problem list and histories reviewed & adjusted, as indicated.  Additional history: none    Patient Active Problem List   Diagnosis     Hyperlipidemia LDL goal <130     Advanced directives, counseling/discussion     PVD (posterior vitreous detachment), left eye     Hypertension, goal below 150/90     Pseudophakia,ou     Bilateral dry eyes     Past Surgical History:   Procedure Laterality Date     APPENDECTOMY OPEN       CATARACT IOL, RT/LT       PHACOEMULSIFICATION WITH STANDARD INTRAOCULAR LENS IMPLANT Right 6/29/2015    Procedure: PHACOEMULSIFICATION WITH STANDARD INTRAOCULAR LENS IMPLANT;  Surgeon: Lisette Lazaro MD;  Location:  OR     PHACOEMULSIFICATION WITH STANDARD INTRAOCULAR LENS IMPLANT Left 8/3/2015    Procedure: PHACOEMULSIFICATION WITH STANDARD INTRAOCULAR LENS IMPLANT;  Surgeon: Lisette Lazaro MD;  Location:  OR       Social History   Substance Use Topics     Smoking status: Former Smoker     Types: Cigarettes     Quit date: 7/6/1990     Smokeless tobacco: Never Used     Alcohol use Yes      Comment: occ wine     Family History   Problem Relation Age of Onset     Hypertension Mother      Glaucoma Mother      HEART DISEASE Father      CANCER Paternal Grandmother      HEART DISEASE Paternal Grandfather       CEREBROVASCULAR DISEASE Sister      Alcohol/Drug Son      Alcohol/Drug Sister      Respiratory Sister      Genitourinary Problems Sister      CEREBROVASCULAR DISEASE Son      Hypertension Sister      CEREBROVASCULAR DISEASE Sister      Macular Degeneration No family hx of          BP Readings from Last 3 Encounters:   08/10/17 172/84   08/08/17 175/87   08/01/17 168/88    Wt Readings from Last 3 Encounters:   08/10/17 107 lb (48.5 kg)   08/08/17 108 lb (49 kg)   08/01/17 109 lb (49.4 kg)                        Reviewed and updated as needed this visit by clinical staff     Reviewed and updated as needed this visit by Provider         ROS:  Constitutional, HEENT, cardiovascular, pulmonary, gi and gu systems are negative, except as otherwise noted.      OBJECTIVE:   /84  Pulse 87  Temp 97  F (36.1  C) (Oral)  Ht 5' (1.524 m)  Wt 107 lb (48.5 kg)  SpO2 97%  BMI 20.9 kg/m2  Body mass index is 20.9 kg/(m^2).  GENERAL: healthy, alert and no distress  RESP: lungs clear to auscultation - no rales, rhonchi or wheezes  CV: regular rate and rhythm, normal S1 S2, no S3 or S4, no murmur, click or rub, no peripheral edema and peripheral pulses strong  PSYCH: mentation appears normal, affect normal/bright    Diagnostic Test Results:  none     ASSESSMENT/PLAN:       ICD-10-CM    1. Hypertension, goal below 150/90 I10 amLODIPine (NORVASC) 2.5 MG tablet     BP has continued to rise despite increase in lisinopril  Recommend discontinuing as it's not effective  Was previously on lisinopril 5 mg for years, but when I review vitals dating back to 2011 her BP was never well controlled  Will start low dose CCB. Reviewed potential side effects  Follow up with RN in 1 week  Patient Instructions   Stop taking lisinopril. I don't think it's effective for you.  Start taking a new medication, called amlodipine.  Follow up in 1-2 weeks. If you develop edema, or other side effects, call the clinic ([phone])      Gem BRAUN  JASPER Cabello [facility]

## 2017-08-25 ENCOUNTER — ALLIED HEALTH/NURSE VISIT (OUTPATIENT)
Dept: NURSING | Facility: CLINIC | Age: 82
End: 2017-08-25
Payer: COMMERCIAL

## 2017-08-25 ENCOUNTER — TELEPHONE (OUTPATIENT)
Dept: FAMILY MEDICINE | Facility: CLINIC | Age: 82
End: 2017-08-25

## 2017-08-25 VITALS
SYSTOLIC BLOOD PRESSURE: 164 MMHG | WEIGHT: 107.4 LBS | HEART RATE: 78 BPM | BODY MASS INDEX: 20.98 KG/M2 | DIASTOLIC BLOOD PRESSURE: 96 MMHG

## 2017-08-25 DIAGNOSIS — I10 HYPERTENSION, GOAL BELOW 150/90: Primary | ICD-10-CM

## 2017-08-25 DIAGNOSIS — I10 HYPERTENSION, GOAL BELOW 150/90: ICD-10-CM

## 2017-08-25 PROCEDURE — 99207 ZZC NO CHARGE NURSE ONLY: CPT

## 2017-08-25 RX ORDER — AMLODIPINE BESYLATE 5 MG/1
5 TABLET ORAL DAILY
Qty: 30 TABLET | Refills: 1 | Status: SHIPPED | OUTPATIENT
Start: 2017-08-25 | End: 2017-08-25 | Stop reason: DRUGHIGH

## 2017-08-25 RX ORDER — HYDROCHLOROTHIAZIDE 25 MG/1
25 TABLET ORAL EVERY MORNING
Qty: 30 TABLET | Refills: 1 | Status: SHIPPED | OUTPATIENT
Start: 2017-08-25 | End: 2017-09-13 | Stop reason: DRUGHIGH

## 2017-08-25 RX ORDER — AMLODIPINE BESYLATE 5 MG/1
5 TABLET ORAL DAILY
Qty: 30 TABLET | Refills: 1 | Status: SHIPPED | OUTPATIENT
Start: 2017-08-25 | End: 2017-10-03

## 2017-08-25 NOTE — MR AVS SNAPSHOT
"              After Visit Summary   2017    Marisol Steele    MRN: [id number]           Patient Information     Date Of Birth          1926        Visit Information        Provider Department      2017 9:00 AM CP RN [facility]        Care Instructions    Gem's team will call you to let you know if Gem wants to change your medication.          Follow-ups after your visit        Who to contact     If you have questions or need follow up information about today's clinic visit or your schedule please contact [facility] directly at [phone].  Normal or non-critical lab and imaging results will be communicated to you by CampusTaphart, letter or phone within 4 business days after the clinic has received the results. If you do not hear from us within 7 days, please contact the clinic through CampusTaphart or phone. If you have a critical or abnormal lab result, we will notify you by phone as soon as possible.  Submit refill requests through ZapMe or call your pharmacy and they will forward the refill request to us. Please allow 3 business days for your refill to be completed.          Additional Information About Your Visit        MyChart Information     ZapMe lets you send messages to your doctor, view your test results, renew your prescriptions, schedule appointments and more. To sign up, go to [email or web address] . Click on \"Log in\" on the left side of the screen, which will take you to the Welcome page. Then click on \"Sign up Now\" on the right side of the page.     You will be asked to enter the access code listed below, as well as some personal information. Please follow the directions to create your username and password.     Your access code is: [email or web address]  Expires: 10/23/2017 12:05 PM     Your access code will  in 90 days. If you need help or a new code, please call your [facility] or [phone].        Care EveryWhere ID     This is " your Care EveryWhere ID. This could be used by other organizations to access your [facility] medical records  [id number]        Your Vitals Were     Pulse BMI (Body Mass Index)                78 20.98 kg/m2           Blood Pressure from Last 3 Encounters:   08/25/17 (!) 164/96   08/10/17 172/84   08/08/17 175/87    Weight from Last 3 Encounters:   08/25/17 107 lb 6.4 oz (48.7 kg)   08/10/17 107 lb (48.5 kg)   08/08/17 108 lb (49 kg)              Today, you had the following     No orders found for display       Primary Care Provider Office Phone # Fax #    Gem Cabello, JASPER [facility] [phone]       [address] [facility] [address]        Equal Access to Services     KADIE RACHEL : Hadii chantelle barron hadasho Soomaali, waaxda luqadaha, qaybta kaalmada adeegyada, darek blanco . So [facility] [phone].    ATENCIÓN: Si habla español, tiene a phan disposición servicios gratuitos de asistencia lingüística. Samaria al [phone].    We comply with applicable federal civil rights laws and [place] laws. We do not discriminate on the basis of race, color, national origin, age, disability sex, sexual orientation or gender identity.            Thank you!     Thank you for choosing [facility]  for your care. Our goal is always to provide you with excellent care. Hearing back from our patients is one way we can continue to improve our services. Please take a few minutes to complete the written survey that you may receive in the mail after your visit with us. Thank you!             Your Updated Medication List - Protect others around you: Learn how to safely use, store and throw away your medicines at www.disposemymeds.org.          This list is accurate as of: 8/25/17  9:10 AM.  Always use your most recent med list.                   Brand Name Dispense Instructions for use Diagnosis    amLODIPine 2.5 MG tablet    NORVASC    30 tablet    Take 1 tablet  (2.5 mg) by mouth daily    Hypertension, goal below 150/90       aspirin 81 MG EC tablet           hydrochlorothiazide 25 MG tablet    HYDRODIURIL    90 tablet    Take 0.5 tablets (12.5 mg) by mouth every morning    Hypertension, goal below 150/90       Multi-vitamin Tabs tablet   Generic drug:  multivitamin, therapeutic with minerals      1 TABLET DAILY        pravastatin 20 MG tablet    PRAVACHOL    90 tablet    Take 1 tablet (20 mg) by mouth daily    Hyperlipidemia LDL goal <130

## 2017-08-25 NOTE — TELEPHONE ENCOUNTER
Attempted to call Marisol, patriciay signal, will need to try later.  I see the increased dose of amlodipine is marked as discontinued and the 2.5 mg dose still on Epic med list.  Routed to provider to clarify that the plan still is to increase the amlodipine as well.   Zaira Hayes RN  [facility]

## 2017-08-25 NOTE — TELEPHONE ENCOUNTER
I called and spoke to Marisol.   She is willing to make the changes, follow up scheduled.    Patient verbalized understanding of and agreement with plan.  Zaira Hayes RN  [facility]

## 2017-08-25 NOTE — TELEPHONE ENCOUNTER
I increased the doses of both her medications. Her BP is very resistant to improve. She will be on 25 mg HCTZ and 5 mg amlodipine. She can finish her current supply by taking 1 full tablet HCTZ and 2 tablets amlodipine until she runs out.   Recheck BP with RN in 1-2 weeks  Also ordered BMP to recheck at that time

## 2017-08-25 NOTE — TELEPHONE ENCOUNTER
Patient was seen by RN today for BP check, she was taken off lisinopril and started 2.5 mg amlodipine when seen by Gem Cabello on 8/10/17.       BP Readings from Last 3 Encounters:   08/25/17 (!) 164/96   08/10/17 172/84   08/08/17 175/87     Checked several times and on both arms.     She denies any symptoms or any swelling, weight is stable.    States she took her amlodipine and HCTZ this AM.    Routed to Gem Cabello to advise on plan.    Ziara Hayes RN  [facility]

## 2017-08-25 NOTE — PROGRESS NOTES
Indications for Blood Pressure monitoring: elevated BP, stopped lisinopril and started amlodipine on 8/10/17; still on HCTZ    Questioned patient about current smoking habits.  Pt. quit smoking some time ago.     Signs and Symptoms:   Headaches: No  Chest pain: No  Shortness of breath: No  Edema: No  Visual problems: No  Parathesia: No  Epitaxis: No  Dizziness: No    Patient states she is feeling fine, denies any ankle swelling, none noted today in clinic.   Weight is stable.      BP:   BP Readings from Last 1 Encounters:   08/25/17 (!) 164/96     78     Diabetic: No  Heart Disease:  No    Phone encounter routed to Gem Cabello to facilitate communication of any changes to patient.    Zaira Hayes RN  [facility]

## 2017-09-12 ENCOUNTER — ALLIED HEALTH/NURSE VISIT (OUTPATIENT)
Dept: NURSING | Facility: CLINIC | Age: 82
End: 2017-09-12
Payer: COMMERCIAL

## 2017-09-12 VITALS
HEART RATE: 76 BPM | WEIGHT: 107.5 LBS | BODY MASS INDEX: 20.99 KG/M2 | DIASTOLIC BLOOD PRESSURE: 88 MMHG | SYSTOLIC BLOOD PRESSURE: 136 MMHG

## 2017-09-12 DIAGNOSIS — I10 HYPERTENSION, GOAL BELOW 150/90: ICD-10-CM

## 2017-09-12 LAB
ANION GAP SERPL CALCULATED.3IONS-SCNC: 13 MMOL/L (ref 3–14)
BUN SERPL-MCNC: 13 MG/DL (ref 7–30)
CALCIUM SERPL-MCNC: 9.6 MG/DL (ref 8.5–10.1)
CHLORIDE SERPL-SCNC: 93 MMOL/L (ref 94–109)
CO2 SERPL-SCNC: 26 MMOL/L (ref 20–32)
CREAT SERPL-MCNC: 0.77 MG/DL (ref 0.52–1.04)
GFR SERPL CREATININE-BSD FRML MDRD: 70 ML/MIN/1.7M2
GLUCOSE SERPL-MCNC: 86 MG/DL (ref 70–99)
POTASSIUM SERPL-SCNC: 2.9 MMOL/L (ref 3.4–5.3)
SODIUM SERPL-SCNC: 132 MMOL/L (ref 133–144)

## 2017-09-12 PROCEDURE — 36415 COLL VENOUS BLD VENIPUNCTURE: CPT | Performed by: NURSE PRACTITIONER

## 2017-09-12 PROCEDURE — 80048 BASIC METABOLIC PNL TOTAL CA: CPT | Performed by: NURSE PRACTITIONER

## 2017-09-12 PROCEDURE — 99207 ZZC NO CHARGE NURSE ONLY: CPT

## 2017-09-12 NOTE — Clinical Note
Marisol Rabago saw me for another BP check today; initially a bit high but was fine on re-check (her goal is actually <150/90).   Denies symptoms, very mild ankle edema.   BMP pending. Thanks! Babita Hayes, RN [facility]

## 2017-09-12 NOTE — PROGRESS NOTES
Indications for Blood Pressure monitoring: elevated, recent med changes (increased doses of HCTZ and amlodipine)    Questioned patient about current smoking habits.  Pt. quit smoking some time ago.     Signs and Symptoms:   Headaches: No  Chest pain: No  Shortness of breath: No  Edema: Yes , very mild to ankles  Visual problems: No  Parathesia: No  Epitaxis: No  Dizziness: No          BP:   BP Readings from Last 1 Encounters:   09/12/17 136/88     76     Diabetic: No  Heart Disease:  No    Patient to lab, future order for BMP released.      Chart routed to provider as FYI, await lab result.  Patient advised to stay on same medications unless advised otherwise.    Zaira Hayes RN  [facility]

## 2017-09-12 NOTE — MR AVS SNAPSHOT
"              After Visit Summary   9/12/2017    Marisol Steele    MRN: [id number]           Patient Information     Date Of Birth          8/8/1926        Visit Information        Provider Department      9/12/2017 12:00 PM CP RN [facility]        Today's Diagnoses     Hypertension, goal below 150/90          Care Instructions    Your blood pressure is under your goal of 150/90 today.   Stay on the same medications unless Gem advises you otherwise.  Gem's team will call you if any of your lab work is abnormal, otherwise, expect a letter.    Call your pharmacy when you are running out of your medication, they will let us know if they need a new authorization sent.          Follow-ups after your visit        Who to contact     If you have questions or need follow up information about today's clinic visit or your schedule please contact [facility] directly at [phone].  Normal or non-critical lab and imaging results will be communicated to you by MyChart, letter or phone within 4 business days after the clinic has received the results. If you do not hear from us within 7 days, please contact the clinic through MyChart or phone. If you have a critical or abnormal lab result, we will notify you by phone as soon as possible.  Submit refill requests through Ad Infuse or call your pharmacy and they will forward the refill request to us. Please allow 3 business days for your refill to be completed.          Additional Information About Your Visit        MyChart Information     Ad Infuse lets you send messages to your doctor, view your test results, renew your prescriptions, schedule appointments and more. To sign up, go to [email or web address] . Click on \"Log in\" on the left side of the screen, which will take you to the Welcome page. Then click on \"Sign up Now\" on the right side of the page.     You will be asked to enter the access code listed below, as well as some " personal information. Please follow the directions to create your username and password.     Your access code is: [email or web address]  Expires: 10/23/2017 12:05 PM     Your access code will  in 90 days. If you need help or a new code, please call your [facility] clinic or [phone].        Care EveryWhere ID     This is your Care EveryWhere ID. This could be used by other organizations to access your [facility] medical records  [id number]        Your Vitals Were     Pulse BMI (Body Mass Index)                76 20.99 kg/m2           Blood Pressure from Last 3 Encounters:   17 136/88   17 (!) 164/96   08/10/17 172/84    Weight from Last 3 Encounters:   17 107 lb 8 oz (48.8 kg)   17 107 lb 6.4 oz (48.7 kg)   08/10/17 107 lb (48.5 kg)              We Performed the Following     **Basic metabolic panel FUTURE 2mo        Primary Care Provider Office Phone # Fax #    Gem Romi Cabello, JASPER [facility] [phone]       [address] [facility] [address]        Equal Access to Services     KADIE RACHEL : Hadii aad ku hadasho Soomaali, waaxda luqadaha, qaybta kaalmada adeegyada, darek blanco . So [facility] [phone].    ATENCIÓN: Si habla español, tiene a phan disposición servicios gratuitos de asistencia lingüística. Llame al [phone].    We comply with applicable federal civil rights laws and [place] laws. We do not discriminate on the basis of race, color, national origin, age, disability sex, sexual orientation or gender identity.            Thank you!     Thank you for choosing [facility]  for your care. Our goal is always to provide you with excellent care. Hearing back from our patients is one way we can continue to improve our services. Please take a few minutes to complete the written survey that you may receive in the mail after your visit with us. Thank you!             Your Updated Medication List - Protect others  around you: Learn how to safely use, store and throw away your medicines at www.disposemymeds.org.          This list is accurate as of: 9/12/17 12:27 PM.  Always use your most recent med list.                   Brand Name Dispense Instructions for use Diagnosis    amLODIPine 5 MG tablet    NORVASC    30 tablet    Take 1 tablet (5 mg) by mouth daily    Hypertension, goal below 150/90       aspirin 81 MG EC tablet           hydrochlorothiazide 25 MG tablet    HYDRODIURIL    30 tablet    Take 1 tablet (25 mg) by mouth every morning    Hypertension, goal below 150/90       Multi-vitamin Tabs tablet   Generic drug:  multivitamin, therapeutic with minerals      1 TABLET DAILY        pravastatin 20 MG tablet    PRAVACHOL    90 tablet    Take 1 tablet (20 mg) by mouth daily    Hyperlipidemia LDL goal <130

## 2017-09-12 NOTE — PATIENT INSTRUCTIONS
Your blood pressure is under your goal of 150/90 today.   Stay on the same medications unless Gem advises you otherwise.  Gem's team will call you if any of your lab work is abnormal, otherwise, expect a letter.    Call your pharmacy when you are running out of your medication, they will let us know if they need a new authorization sent.

## 2017-09-13 ENCOUNTER — TELEPHONE (OUTPATIENT)
Dept: FAMILY MEDICINE | Facility: CLINIC | Age: 82
End: 2017-09-13

## 2017-09-13 DIAGNOSIS — I10 HYPERTENSION, GOAL BELOW 150/90: Primary | ICD-10-CM

## 2017-09-13 RX ORDER — AMLODIPINE BESYLATE 2.5 MG/1
2.5 TABLET ORAL DAILY
Qty: 30 TABLET | Refills: 1 | Status: SHIPPED | OUTPATIENT
Start: 2017-09-13 | End: 2017-10-03

## 2017-09-13 RX ORDER — HYDROCHLOROTHIAZIDE 12.5 MG/1
12.5 TABLET ORAL DAILY
Qty: 30 TABLET | Refills: 1 | Status: SHIPPED | OUTPATIENT
Start: 2017-09-13 | End: 2017-10-03

## 2017-09-13 RX ORDER — POTASSIUM CHLORIDE 750 MG/1
10 TABLET, EXTENDED RELEASE ORAL DAILY
Qty: 7 TABLET | Refills: 0 | Status: SHIPPED | OUTPATIENT
Start: 2017-09-13 | End: 2017-09-20

## 2017-09-13 NOTE — TELEPHONE ENCOUNTER
I see that we finally got patient's blood pressure within goal with the previous medication adjustments, but now her sodium and potassium have dropped. I reduced her HCTZ back to 12.5 mg which she was on previously.   I increased her amlodipine to 7.5 mg daily. She is already taking a 5 mg tablet. I ordered a 2.5 mg tablet. She will take one of each for a total of 7.5 mg  I also ordered a potassium tablet once daily for 1 week to help bring her potassium up  I recommend rechecking her labs and follow up with nurse for a BP check in 2 weeks

## 2017-09-13 NOTE — LETTER
Dear Marisol,    You spoke with DARIEL Dockery on 09/13/17 regarding some instructions from your provider. You requested that we also mail these instructions to you as well. Here are the instructions that Gem advised:   I see that we finally got patient's blood pressure within goal with the previous medication adjustments, but now her sodium and potassium have dropped. I reduced her HCTZ back to 12.5 mg which she was on previously.   I increased her amlodipine to 7.5 mg daily. She is already taking a 5 mg tablet. I ordered a 2.5 mg tablet. She will take one of each for a total of 7.5 mg  I also ordered a potassium tablet once daily for 1 week to help bring her potassium up  I recommend rechecking her labs and follow up with nurse for a BP check in 2 weeks      Your lab appointment is scheduled on 09/27/2017 at 2:00PM.  Your blood pressure recheck is scheduled on 09/27/2017 at 10:15AM with the Nurse.    If you have any questions, please call the clinic at [phone].        Sincerely,       Gem HUTCHINSON CNP  LMD

## 2017-09-13 NOTE — TELEPHONE ENCOUNTER
Attempt # 1  Called patient at home number.  Was call answered?  Yes, relayed below information - patient requests we send this information by mail also - explained prescriptions at pharmacy - scheduled lab appointment for 9/27/17 at 10 am. To nurse after lab for BP check.     Routing to TC to send letter with below information and appointment schedule to patient.   Nahed Nugent RN  [facility]

## 2017-09-14 NOTE — TELEPHONE ENCOUNTER
Letter printed with instructions from provider along with appointment times as scheduled. Letter mailed to patient.

## 2017-09-15 NOTE — TELEPHONE ENCOUNTER
I called Marisol back as I would prefer to see her on the RN schedule rather than have her seen quickly by any available RN; we have been working on her BP for some time.  She was happy to reschedule and would like to see the same nurse.    Reviewed the med changes, she was confused about the amlodipine change and had not yet picked up the potassium.   She states she will go get the additional amlodipine and start the potassium after she picks it up today.    Zaira Hayes, DARIEL  [facility]

## 2017-09-26 ENCOUNTER — TELEPHONE (OUTPATIENT)
Dept: FAMILY MEDICINE | Facility: CLINIC | Age: 82
End: 2017-09-26

## 2017-09-26 NOTE — TELEPHONE ENCOUNTER
TC contacted patient and she did think her appointment was tomorrow. TC rescheduled RN BP appointment on 10/02/2017.

## 2017-09-26 NOTE — TELEPHONE ENCOUNTER
Patient did not come in for RN BP check today, was to go down for lab draw afterwards (has future lab order).    There was some confusion over her scheduling and she had originally been scheduled for this tomorrow but no RN schedule on Wednesdays so I called an rescheduled her as well as clarified some misunderstanding on her meds.  See 9/13/17 phone note.      Attempted to call patient at home number twice (10:10 am and now), line rang a couple times then got busy signal.    I called daughter/EC and spoke to Laith Steele, she is not aware of any phone issues and has no other contact number for Marisol.   I did advise Laith to let Marisol know she missed visit with nurse today, please call to reschedule.     I would not be surprised if patient walked in tomorrow for this.  Routed to team to reach out to patient to reschedule RN BP check.  Zaira Hayes, RN  [facility]

## 2017-09-26 NOTE — TELEPHONE ENCOUNTER
Attempted call to patient home number [phone] at this time, again a couple of rings and then busy signal.  Perhaps try again later.    Zaira Hayes RN  [facility]

## 2017-10-02 ENCOUNTER — ALLIED HEALTH/NURSE VISIT (OUTPATIENT)
Dept: NURSING | Facility: CLINIC | Age: 82
End: 2017-10-02
Payer: COMMERCIAL

## 2017-10-02 VITALS
WEIGHT: 106 LBS | BODY MASS INDEX: 20.7 KG/M2 | DIASTOLIC BLOOD PRESSURE: 68 MMHG | HEART RATE: 66 BPM | SYSTOLIC BLOOD PRESSURE: 130 MMHG

## 2017-10-02 DIAGNOSIS — I10 HYPERTENSION, GOAL BELOW 150/90: ICD-10-CM

## 2017-10-02 LAB
ANION GAP SERPL CALCULATED.3IONS-SCNC: 12 MMOL/L (ref 3–14)
BUN SERPL-MCNC: 11 MG/DL (ref 7–30)
CALCIUM SERPL-MCNC: 9.2 MG/DL (ref 8.5–10.1)
CHLORIDE SERPL-SCNC: 100 MMOL/L (ref 94–109)
CO2 SERPL-SCNC: 26 MMOL/L (ref 20–32)
CREAT SERPL-MCNC: 0.71 MG/DL (ref 0.52–1.04)
GFR SERPL CREATININE-BSD FRML MDRD: 77 ML/MIN/1.7M2
GLUCOSE SERPL-MCNC: 146 MG/DL (ref 70–99)
POTASSIUM SERPL-SCNC: 3.2 MMOL/L (ref 3.4–5.3)
SODIUM SERPL-SCNC: 138 MMOL/L (ref 133–144)

## 2017-10-02 PROCEDURE — 36415 COLL VENOUS BLD VENIPUNCTURE: CPT | Performed by: NURSE PRACTITIONER

## 2017-10-02 PROCEDURE — 99207 ZZC NO CHARGE NURSE ONLY: CPT

## 2017-10-02 PROCEDURE — 80048 BASIC METABOLIC PNL TOTAL CA: CPT | Performed by: NURSE PRACTITIONER

## 2017-10-02 NOTE — PATIENT INSTRUCTIONS
Call [facility] pharmacy for another 1 month refill of your 5 mg amlodipine.     When you are running out of 25 mg hydrochlorothiazide (which you are now taking 1/2 tablet) they have a new prescription of 12.5 mg hydrochlorothiazide available to start (you may have to talk directly to a pharmacist as you won't be able to put in a refill number for that).    Gem's team will call you if your labs are abnormal, otherwise you may just get a letter.  They will address more refills and plan for follow up after the labs are back.

## 2017-10-02 NOTE — MR AVS SNAPSHOT
"              After Visit Summary   10/2/2017    Marisol Steele    MRN: [id number]           Patient Information     Date Of Birth          8/8/1926        Visit Information        Provider Department      10/2/2017 10:00 AM CP RN [facility]        Today's Diagnoses     Hypertension, goal below 150/90          Care Instructions    Call [facility] pharmacy for another 1 month refill of your 5 mg amlodipine.     When you are running out of 25 mg hydrochlorothiazide (which you are now taking 1/2 tablet) they have a new prescription of 12.5 mg hydrochlorothiazide available to start (you may have to talk directly to a pharmacist as you won't be able to put in a refill number for that).    Gem's team will call you if your labs are abnormal, otherwise you may just get a letter.  They will address more refills and plan for follow up after the labs are back.          Follow-ups after your visit        Who to contact     If you have questions or need follow up information about today's clinic visit or your schedule please contact [facility] directly at [phone].  Normal or non-critical lab and imaging results will be communicated to you by Modern Boutiquehart, letter or phone within 4 business days after the clinic has received the results. If you do not hear from us within 7 days, please contact the clinic through Modern Boutiquehart or phone. If you have a critical or abnormal lab result, we will notify you by phone as soon as possible.  Submit refill requests through Et3arraf or call your pharmacy and they will forward the refill request to us. Please allow 3 business days for your refill to be completed.          Additional Information About Your Visit        MyChart Information     Et3arraf lets you send messages to your doctor, view your test results, renew your prescriptions, schedule appointments and more. To sign up, go to [email or web address] . Click on \"Log in\" on the left side of the " "screen, which will take you to the Welcome page. Then click on \"Sign up Now\" on the right side of the page.     You will be asked to enter the access code listed below, as well as some personal information. Please follow the directions to create your username and password.     Your access code is: [email or web address]  Expires: 10/23/2017 12:05 PM     Your access code will  in 90 days. If you need help or a new code, please call your [place] clinic or [phone].        Care EveryWhere ID     This is your Care EveryWhere ID. This could be used by other organizations to access your [place] medical records  [id number]        Your Vitals Were     Pulse BMI (Body Mass Index)                66 20.7 kg/m2           Blood Pressure from Last 3 Encounters:   10/02/17 130/68   17 136/88   17 (!) 164/96    Weight from Last 3 Encounters:   10/02/17 106 lb (48.1 kg)   17 107 lb 8 oz (48.8 kg)   17 107 lb 6.4 oz (48.7 kg)              We Performed the Following     **Basic metabolic panel FUTURE 2mo        Primary Care Provider Office Phone # Fax #    Gem JASPER Muro [facility] [phone]       [address] [facility] [address]        Equal Access to Services     KADIE RACHEL : Hadii chantelle ku hadasho Soomaali, waaxda luqadaha, qaybta kaalmada adeegyada, darek blanco . So [facility] [phone].    ATENCIÓN: Si habla español, tiene a phan disposición servicios gratuitos de asistencia lingüística. Samaria al [phone].    We comply with applicable federal civil rights laws and [place] laws. We do not discriminate on the basis of race, color, national origin, age, disability, sex, sexual orientation, or gender identity.            Thank you!     Thank you for choosing [facility]  for your care. Our goal is always to provide you with excellent care. Hearing back from our patients is one way we can continue to improve our services. " Please take a few minutes to complete the written survey that you may receive in the mail after your visit with us. Thank you!             Your Updated Medication List - Protect others around you: Learn how to safely use, store and throw away your medicines at www.disposemymeds.org.          This list is accurate as of: 10/2/17 10:10 AM.  Always use your most recent med list.                   Brand Name Dispense Instructions for use Diagnosis    * amLODIPine 5 MG tablet    NORVASC    30 tablet    Take 1 tablet (5 mg) by mouth daily    Hypertension, goal below 150/90       * amLODIPine 2.5 MG tablet    NORVASC    30 tablet    Take 1 tablet (2.5 mg) by mouth daily    Hypertension, goal below 150/90       aspirin 81 MG EC tablet           hydrochlorothiazide 12.5 MG Tabs tablet     30 tablet    Take 1 tablet (12.5 mg) by mouth daily    Hypertension, goal below 150/90       Multi-vitamin Tabs tablet   Generic drug:  multivitamin, therapeutic with minerals      1 TABLET DAILY        pravastatin 20 MG tablet    PRAVACHOL    90 tablet    Take 1 tablet (20 mg) by mouth daily    Hyperlipidemia LDL goal <130       * Notice:  This list has 2 medication(s) that are the same as other medications prescribed for you. Read the directions carefully, and ask your doctor or other care provider to review them with you.

## 2017-10-02 NOTE — PROGRESS NOTES
Indications for Blood Pressure monitoring: recent med changes    Questioned patient about current smoking habits.  Pt. quit smoking some time ago.     Signs and Symptoms:   Headaches: No  Chest pain: No  Shortness of breath: No  Edema: No  Visual problems: No  Parathesia: No  Epitaxis: No  Dizziness: No          BP:   BP Readings from Last 1 Encounters:   10/02/17 130/68     66     Diabetic: No  Heart Disease:  No    Chart routed to Gem Cabello as OG, BMP pending.   Patient was advised to call pharmacy for refill of 5 mg amlodipine (she recently ran out and took 3 of her 2.5 mg amlodipine today).    Zaira Hayes RN  [facility]

## 2017-10-02 NOTE — LETTER
[facility]  [address] [place]  [address]  [phone]        October 4, 2017    Marisol Steele  [address] [facility] [address]        Dear Marisol,    The results of your recent labs are enclosed.  Your potassium is improved, but still slightly below normal. Try to increase the potassium in your diet.   It should continue to improve since we decreased the dose of your diuretic.   I see your blood pressure is finally at goal. Good work!  I ordered a repeat blood test for you to do in 1 month just to ensure that your potassium has normalized. You can schedule a lab only appointment mid November.   I sent refills of your medications to the [facility] pharmacy in [place].     Please call the clinic if you have any concerns.    Results for orders placed or performed in visit on 10/02/17   **Basic metabolic panel FUTURE 2mo   Result Value Ref Range    Sodium 138 133 - 144 mmol/L    Potassium 3.2 (L) 3.4 - 5.3 mmol/L    Chloride 100 94 - 109 mmol/L    Carbon Dioxide 26 20 - 32 mmol/L    Anion Gap 12 3 - 14 mmol/L    Glucose 146 (H) 70 - 99 mg/dL    Urea Nitrogen 11 7 - 30 mg/dL    Creatinine 0.71 0.52 - 1.04 mg/dL    GFR Estimate 77 >60 mL/min/1.7m2    GFR Estimate If Black >90 >60 mL/min/1.7m2    Calcium 9.2 8.5 - 10.1 mg/dL       If you have any questions please call the clinic at [phone].    Sincerely,    Gem HUTCHINSON CNP  LMD

## 2017-10-02 NOTE — Clinical Note
GemMarisol carrillo's BP looks great on the current regimen.   BMP pending; she will need longer refills sent to [facility] if BMP looks good. Thanks! Babita Hayes RN [facility] [place] [facility]

## 2017-10-03 DIAGNOSIS — I10 HYPERTENSION, GOAL BELOW 150/90: ICD-10-CM

## 2017-10-03 RX ORDER — HYDROCHLOROTHIAZIDE 12.5 MG/1
12.5 TABLET ORAL DAILY
Qty: 90 TABLET | Refills: 3 | Status: SHIPPED | OUTPATIENT
Start: 2017-10-03 | End: 2018-09-20

## 2017-10-03 RX ORDER — AMLODIPINE BESYLATE 5 MG/1
5 TABLET ORAL DAILY
Qty: 90 TABLET | Refills: 3 | Status: SHIPPED | OUTPATIENT
Start: 2017-10-03 | End: 2018-09-20

## 2017-10-03 RX ORDER — AMLODIPINE BESYLATE 2.5 MG/1
2.5 TABLET ORAL DAILY
Qty: 90 TABLET | Refills: 3 | Status: SHIPPED | OUTPATIENT
Start: 2017-10-03 | End: 2018-03-12 | Stop reason: SINTOL

## 2017-10-03 NOTE — PROGRESS NOTES
[address] [address]  Phone: [phone]      10/03/17    Marisol Steele  [address] [facility] [address]      Dear Marisol,    The results of your recent labs are enclosed.  Your potassium is improved, but still slightly below normal. Try to increase the potassium in your diet.   It should continue to improve since we decreased the dose of your diuretic.   I see your blood pressure is finally at goal. Good work!  I ordered a repeat blood test for you to do in 1 month just to ensure that your potassium has normalized. You can schedule a lab only appointment mid November.   I sent refills of your medications to the [facility] pharmacy in [place].     Please call the clinic if you have any concerns.    Sincerely,    JASPER Quintana CNP    Your [facility] Care Team

## 2017-11-14 DIAGNOSIS — I10 HYPERTENSION, GOAL BELOW 150/90: ICD-10-CM

## 2017-11-14 LAB — POTASSIUM SERPL-SCNC: 3.5 MMOL/L (ref 3.4–5.3)

## 2017-11-14 PROCEDURE — 36415 COLL VENOUS BLD VENIPUNCTURE: CPT | Performed by: NURSE PRACTITIONER

## 2017-11-14 PROCEDURE — 84132 ASSAY OF SERUM POTASSIUM: CPT | Performed by: NURSE PRACTITIONER

## 2017-11-14 NOTE — LETTER
[facility]  [address] [place]  [address]  [phone]        November 16, 2017    Marisol Steele  [address] [place] [address]        Dear Marisol,    The results of your recent labs are enclosed.   Your potassium is within normal range and I see we finally have your blood pressure at goal.   Good work! I'm glad we've got you on the right combination of medications.     Results for orders placed or performed in visit on 11/14/17   **Potassium FUTURE anytime   Result Value Ref Range    Potassium 3.5 3.4 - 5.3 mmol/L       If you have any questions please call the clinic at [phone].    Sincerely,    Gem Cabello, CNP  SKL

## 2017-11-16 NOTE — PROGRESS NOTES
[address] [address]  Phone: [phone]      11/16/17    Marisol Steele  [address] [place] [address]      Dear Marisol,    The results of your recent labs are enclosed.  Your potassium is within normal range and I see we finally have your blood pressure at goal.  Good work! I'm glad we've got you on the right combination of medications.     Please call the clinic if you have any concerns.    Sincerely,    JASPER Quintana CNP    Your [facility] Care Team

## 2017-12-04 ENCOUNTER — TELEPHONE (OUTPATIENT)
Dept: FAMILY MEDICINE | Facility: CLINIC | Age: 82
End: 2017-12-04

## 2017-12-04 NOTE — TELEPHONE ENCOUNTER
Called and spoke with patient, advised of message below.  She did get one of her Rx's for 90 days.    Norah Jansen RN  [facility]

## 2017-12-04 NOTE — TELEPHONE ENCOUNTER
Patient returned call - left message on nurses voice mail. Please call [phone]    Nahed Nugent RN  [facility]

## 2017-12-04 NOTE — TELEPHONE ENCOUNTER
All meds prescribed by Gem in October were for 90 days.  Called pharmacy.  They have 90 day supply of 2.5mg & 5mg Amlodipine; they see 90 day supply of HCTZ.  They do have the 90 day supply on file for all medications.  Patient has received 2 - 30 day supplies, so will possibly receive 1 more 30 day fill to fulfill the original 90 day supply then she will receive 90 days from then on.  Also, could possibly have been due to Amlodipine shortage.    Attempted to call patient at [phone] (home), no answer.  Left VM to return call to RN Triage line.    Norah Jansen RN  [facility]

## 2017-12-04 NOTE — TELEPHONE ENCOUNTER
Received mailed request from patient:    I would like my meds in 90 day supplies. The blood pressure meds are in 30 day supplies at this time. Thank you. Marisol Steele

## 2018-03-08 DIAGNOSIS — E78.5 HYPERLIPIDEMIA LDL GOAL <130: ICD-10-CM

## 2018-03-08 RX ORDER — PRAVASTATIN SODIUM 20 MG
TABLET ORAL
Qty: 90 TABLET | Refills: 1 | Status: SHIPPED | OUTPATIENT
Start: 2018-03-08 | End: 2018-09-10

## 2018-03-08 NOTE — TELEPHONE ENCOUNTER
"Requested Prescriptions   Pending Prescriptions Disp Refills     pravastatin (PRAVACHOL) 20 MG tablet [Pharmacy Med Name: PRAVASTATIN 20MG    TAB] 90 tablet 3    Last Written Prescription Date:  2/20/17  Last Fill Quantity: 90,  # refills: 3   Last office visit: 8/10/2017 with prescribing provider:     Future Office Visit:     Sig: TAKE ONE TABLET BY MOUTH ONCE DAILY    Statins Protocol Passed    3/8/2018 10:42 AM       Passed - LDL on file in past 12 months    Recent Labs   Lab Test  07/25/17   1207   LDL  97            Passed - No abnormal creatine kinase in past 12 months    No lab results found.         Passed - Recent (12 mo) or future (30 days) visit within the authorizing provider's specialty    Patient had office visit in the last year or has a visit in the next 30 days with authorizing provider.  See \"Patient Info\" tab in inbasket, or \"Choose Columns\" in Meds & Orders section of the refill encounter.            Passed - Patient is age 18 or older       Passed - No active pregnancy on record       Passed - No positive pregnancy test in past 12 months          "

## 2018-03-08 NOTE — TELEPHONE ENCOUNTER
Prescription approved per [facility] [place] [facility].    Due for OV/labs July/Aug 2018.    Raphael Jon RN

## 2018-03-12 ENCOUNTER — OFFICE VISIT (OUTPATIENT)
Dept: FAMILY MEDICINE | Facility: CLINIC | Age: 83
End: 2018-03-12
Payer: COMMERCIAL

## 2018-03-12 VITALS
OXYGEN SATURATION: 96 % | HEIGHT: 60 IN | WEIGHT: 106 LBS | SYSTOLIC BLOOD PRESSURE: 131 MMHG | HEART RATE: 84 BPM | TEMPERATURE: 96.9 F | BODY MASS INDEX: 20.81 KG/M2 | DIASTOLIC BLOOD PRESSURE: 79 MMHG

## 2018-03-12 DIAGNOSIS — R60.0 PEDAL EDEMA: ICD-10-CM

## 2018-03-12 DIAGNOSIS — I10 HYPERTENSION, GOAL BELOW 150/90: Primary | ICD-10-CM

## 2018-03-12 PROCEDURE — 99213 OFFICE O/P EST LOW 20 MIN: CPT | Performed by: NURSE PRACTITIONER

## 2018-03-12 ASSESSMENT — PAIN SCALES - GENERAL: PAINLEVEL: NO PAIN (0)

## 2018-03-12 NOTE — MR AVS SNAPSHOT
"              After Visit Summary   3/12/2018    Marisol Steele    MRN: [id number]           Patient Information     Date Of Birth          8/8/1926        Visit Information        Provider Department      3/12/2018 10:40 AM Gem Cabello APRN CNP [facility]        Today's Diagnoses     Hypertension, goal below 150/90    -  1      Care Instructions    I reduced your dose of amlodipine to 5 mg. You can stop the 2.5 mg tablet and you will only take 5 mg tablet daily  If swelling improves, no additional follow up needed  Let me know if swelling doesn't improve over the next 1-2 weeks    Elevate your legs            Follow-ups after your visit        Who to contact     If you have questions or need follow up information about today's clinic visit or your schedule please contact [facility] directly at [phone].  Normal or non-critical lab and imaging results will be communicated to you by MyChart, letter or phone within 4 business days after the clinic has received the results. If you do not hear from us within 7 days, please contact the clinic through MyChart or phone. If you have a critical or abnormal lab result, we will notify you by phone as soon as possible.  Submit refill requests through Accera or call your pharmacy and they will forward the refill request to us. Please allow 3 business days for your refill to be completed.          Additional Information About Your Visit        MyChart Information     Accera lets you send messages to your doctor, view your test results, renew your prescriptions, schedule appointments and more. To sign up, go to [email or web address] . Click on \"Log in\" on the left side of the screen, which will take you to the Welcome page. Then click on \"Sign up Now\" on the right side of the page.     You will be asked to enter the access code listed below, as well as some personal information. Please follow the directions to " create your username and password.     Your access code is: [email or web address]  Expires: 6/10/2018 11:45 AM     Your access code will  in 90 days. If you need help or a new code, please call your [place] clinic or [phone].        Care EveryWhere ID     This is your Care EveryWhere ID. This could be used by other organizations to access your [place] medical records  [id number]        Your Vitals Were     Pulse Temperature Height Pulse Oximetry Breastfeeding? BMI (Body Mass Index)    84 96.9  F (36.1  C) (Oral) 5' (1.524 m) 96% No 20.7 kg/m2       Blood Pressure from Last 3 Encounters:   18 131/79   10/02/17 130/68   17 136/88    Weight from Last 3 Encounters:   18 106 lb (48.1 kg)   10/02/17 106 lb (48.1 kg)   17 107 lb 8 oz (48.8 kg)              Today, you had the following     No orders found for display         Today's Medication Changes          These changes are accurate as of 3/12/18 11:49 AM.  If you have any questions, ask your nurse or doctor.               These medicines have changed or have updated prescriptions.        Dose/Directions    amLODIPine 5 MG tablet   Commonly known as:  NORVASC   This may have changed:  Another medication with the same name was removed. Continue taking this medication, and follow the directions you see here.   Used for:  Hypertension, goal below 150/90   Changed by:  Gem Cabello APRN CNP        Dose:  5 mg   Take 1 tablet (5 mg) by mouth daily   Quantity:  90 tablet   Refills:  3                Primary Care Provider Office Phone # Fax #    JASPER Bravo [phone]       [address] [facility] [address]        Equal Access to Services     HEATHER RACHEL : Sangeeta Perez, waaxda luqadaha, qaybta kaalmada stephane, darek whitaker. So [facility] [phone].    ATENCIÓN: Si habla español, tiene a phan disposición servicios gratuitos de asistencia  lingüística. Samaria al [phone].    We comply with applicable federal civil rights laws and [place] laws. We do not discriminate on the basis of race, color, national origin, age, disability, sex, sexual orientation, or gender identity.            Thank you!     Thank you for choosing [facility]  for your care. Our goal is always to provide you with excellent care. Hearing back from our patients is one way we can continue to improve our services. Please take a few minutes to complete the written survey that you may receive in the mail after your visit with us. Thank you!             Your Updated Medication List - Protect others around you: Learn how to safely use, store and throw away your medicines at www.disposemymeds.org.          This list is accurate as of 3/12/18 11:49 AM.  Always use your most recent med list.                   Brand Name Dispense Instructions for use Diagnosis    amLODIPine 5 MG tablet    NORVASC    90 tablet    Take 1 tablet (5 mg) by mouth daily    Hypertension, goal below 150/90       aspirin 81 MG EC tablet           hydrochlorothiazide 12.5 MG Tabs tablet     90 tablet    Take 1 tablet (12.5 mg) by mouth daily    Hypertension, goal below 150/90       Multi-vitamin Tabs tablet   Generic drug:  multivitamin, therapeutic with minerals      1 TABLET DAILY        pravastatin 20 MG tablet    PRAVACHOL    90 tablet    TAKE ONE TABLET BY MOUTH ONCE DAILY    Hyperlipidemia LDL goal <130

## 2018-03-12 NOTE — PATIENT INSTRUCTIONS
I reduced your dose of amlodipine to 5 mg. You can stop the 2.5 mg tablet and you will only take 5 mg tablet daily  If swelling improves, no additional follow up needed  Let me know if swelling doesn't improve over the next 1-2 weeks    Elevate your legs

## 2018-03-12 NOTE — PROGRESS NOTES
"  SUBJECTIVE:   Marisol Steele is a 89 year old female who presents to clinic today for the following health issues:      Concern - Feet swelling  Onset: since last Annette    Description:     As the day goes on she notice that here feet are swelling without pain, the swelling goes during the night.    Intensity: mild    Progression of Symptoms:  same    Accompanying Signs & Symptoms:  nothing    Previous history of similar problem:   no    Precipitating factors:   Worsened by: maybe sitting to much    Alleviating factors:  Improved by: rest     Therapies Tried and outcome:   \" Has not tried anything\". Thought that maybe \" the warm moist air in [place] might have been the problem\".        Problem list and histories reviewed & adjusted, as indicated.  \" Has not tried anything\". Thought that maybe \" the warm moist air in [place] might have been the problem\".     Noticed swelling started \"sometime after [place]\". \"I can see it\". No throbbing or pain. \" Never noticed that the  left is more swollen than the right. I have never compared them\".  Denies falling, dizziness or shortness of breath. Denies numbness tingling in feet and toes.    \"Used to walk a lot. The hill is tough in the winter.\"         Reviewed and updated as needed this visit by clinical staff  Tobacco  Allergies  Meds  Med Hx  Surg Hx  Fam Hx  Soc Hx      Reviewed and updated as needed this visit by Provider         ROS:  Constitutional, HEENT, cardiovascular, pulmonary, gi and gu systems are negative, except as otherwise noted.    OBJECTIVE:     /79 (BP Location: Right arm, Patient Position: Chair, Cuff Size: Adult Small)  Pulse 84  Temp 96.9  F (36.1  C) (Oral)  Ht 5' (1.524 m)  Wt 106 lb (48.1 kg)  SpO2 96%  Breastfeeding? No  BMI 20.7 kg/m2  Body mass index is 20.7 kg/(m^2).  CV: SR. No rubs no murmurs. BLLE-  Bilateral ankle edema 1+. Dorsalis pedis 2+ Posterior tibial 2+  Capillary refill <3 seconds  RESP: Lung sounds clear to " ascultation.  SKIN: BLLE warm dry intact.   CMS: BLLE intact       Diagnostic Test Results:none    ASSESSMENT/PLAN:     Differential include: medication related edema vs cardiovascular insufficiency vs  lymphedema        ICD-10-CM    1. Hypertension, goal below 150/90 I10    2. Pedal edema R60.0      Concern that new edema r/t CCB. I hesitate to DC amlodipine as we worked hard to get her BP under control. Will decrease dose and monitor symptoms  Follow up with annual exam in 1-2 months  Please contact your provider if you experience increased swelling, shortness of breath or difficulty breathing.  Please take your medication as prescribed.  Elevate your every evening  to reduce swelling in your legs.    JASPER Quintana [facility]

## 2018-09-10 DIAGNOSIS — E78.5 HYPERLIPIDEMIA LDL GOAL <130: ICD-10-CM

## 2018-09-10 NOTE — TELEPHONE ENCOUNTER
"Requested Prescriptions   Pending Prescriptions Disp Refills     pravastatin (PRAVACHOL) 20 MG tablet [Pharmacy Med Name: PRAVASTATIN 20MG    TAB] 90 tablet 1    Last Written Prescription Date:  3-8-18  Last Fill Quantity: 90,  # refills: 1   Last office visit: 3/12/2018 with prescribing provider:  3-12-18   Future Office Visit:     Sig: TAKE 1 TABLET BY MOUTH ONCE DAILY    Statins Protocol Failed    9/10/2018 10:44 AM       Failed - LDL on file in past 12 months    Recent Labs   Lab Test  07/25/17   1207   LDL  97            Passed - No abnormal creatine kinase in past 12 months    No lab results found.            Passed - Recent (12 mo) or future (30 days) visit within the authorizing provider's specialty    Patient had office visit in the last 12 months or has a visit in the next 30 days with authorizing provider or within the authorizing provider's specialty.  See \"Patient Info\" tab in inbasket, or \"Choose Columns\" in Meds & Orders section of the refill encounter.           Passed - Patient is age 18 or older       Passed - No active pregnancy on record       Passed - No positive pregnancy test in past 12 months          "

## 2018-09-12 RX ORDER — PRAVASTATIN SODIUM 20 MG
TABLET ORAL
Qty: 30 TABLET | Refills: 0 | Status: SHIPPED | OUTPATIENT
Start: 2018-09-12 | End: 2018-09-20

## 2018-09-20 ENCOUNTER — OFFICE VISIT (OUTPATIENT)
Dept: FAMILY MEDICINE | Facility: CLINIC | Age: 83
End: 2018-09-20
Payer: COMMERCIAL

## 2018-09-20 VITALS
SYSTOLIC BLOOD PRESSURE: 128 MMHG | WEIGHT: 109 LBS | DIASTOLIC BLOOD PRESSURE: 81 MMHG | HEART RATE: 86 BPM | OXYGEN SATURATION: 98 % | TEMPERATURE: 97.3 F | BODY MASS INDEX: 21.29 KG/M2

## 2018-09-20 DIAGNOSIS — I10 HYPERTENSION, GOAL BELOW 150/90: ICD-10-CM

## 2018-09-20 DIAGNOSIS — Z23 NEED FOR PROPHYLACTIC VACCINATION AND INOCULATION AGAINST INFLUENZA: ICD-10-CM

## 2018-09-20 DIAGNOSIS — E78.5 HYPERLIPIDEMIA LDL GOAL <130: ICD-10-CM

## 2018-09-20 DIAGNOSIS — Z78.0 ASYMPTOMATIC POSTMENOPAUSAL STATUS: ICD-10-CM

## 2018-09-20 DIAGNOSIS — Z00.00 ROUTINE GENERAL MEDICAL EXAMINATION AT A HEALTH CARE FACILITY: Primary | ICD-10-CM

## 2018-09-20 LAB
ANION GAP SERPL CALCULATED.3IONS-SCNC: 9 MMOL/L (ref 3–14)
BUN SERPL-MCNC: 14 MG/DL (ref 7–30)
CALCIUM SERPL-MCNC: 9 MG/DL (ref 8.5–10.1)
CHLORIDE SERPL-SCNC: 102 MMOL/L (ref 94–109)
CHOLEST SERPL-MCNC: 152 MG/DL
CO2 SERPL-SCNC: 28 MMOL/L (ref 20–32)
CREAT SERPL-MCNC: 0.76 MG/DL (ref 0.52–1.04)
GFR SERPL CREATININE-BSD FRML MDRD: 71 ML/MIN/1.7M2
GLUCOSE SERPL-MCNC: 88 MG/DL (ref 70–99)
HDLC SERPL-MCNC: 82 MG/DL
LDLC SERPL CALC-MCNC: 54 MG/DL
NONHDLC SERPL-MCNC: 70 MG/DL
POTASSIUM SERPL-SCNC: 3.4 MMOL/L (ref 3.4–5.3)
SODIUM SERPL-SCNC: 139 MMOL/L (ref 133–144)
TRIGL SERPL-MCNC: 82 MG/DL

## 2018-09-20 PROCEDURE — G0008 ADMIN INFLUENZA VIRUS VAC: HCPCS | Performed by: NURSE PRACTITIONER

## 2018-09-20 PROCEDURE — G0438 PPPS, INITIAL VISIT: HCPCS | Performed by: NURSE PRACTITIONER

## 2018-09-20 PROCEDURE — 90662 IIV NO PRSV INCREASED AG IM: CPT | Performed by: NURSE PRACTITIONER

## 2018-09-20 PROCEDURE — 80048 BASIC METABOLIC PNL TOTAL CA: CPT | Performed by: NURSE PRACTITIONER

## 2018-09-20 PROCEDURE — 80061 LIPID PANEL: CPT | Performed by: NURSE PRACTITIONER

## 2018-09-20 PROCEDURE — 36415 COLL VENOUS BLD VENIPUNCTURE: CPT | Performed by: NURSE PRACTITIONER

## 2018-09-20 RX ORDER — AMLODIPINE BESYLATE 5 MG/1
5 TABLET ORAL DAILY
Qty: 90 TABLET | Refills: 3 | Status: SHIPPED | OUTPATIENT
Start: 2018-09-20 | End: 2019-08-20

## 2018-09-20 RX ORDER — PRAVASTATIN SODIUM 20 MG
20 TABLET ORAL DAILY
Qty: 90 TABLET | Refills: 3 | Status: SHIPPED | OUTPATIENT
Start: 2018-09-20 | End: 2019-08-20

## 2018-09-20 RX ORDER — HYDROCHLOROTHIAZIDE 12.5 MG/1
12.5 TABLET ORAL DAILY
Qty: 90 TABLET | Refills: 3 | Status: SHIPPED | OUTPATIENT
Start: 2018-09-20 | End: 2019-08-20

## 2018-09-20 ASSESSMENT — PAIN SCALES - GENERAL: PAINLEVEL: NO PAIN (0)

## 2018-09-20 NOTE — PROGRESS NOTES
Injectable Influenza Immunization Documentation    1.  Is the person to be vaccinated sick today?   No    2. Does the person to be vaccinated have an allergy to a component   of the vaccine?   No  Egg Allergy Algorithm Link    3. Has the person to be vaccinated ever had a serious reaction   to influenza vaccine in the past?   No    4. Has the person to be vaccinated ever had Guillain-Barré syndrome?   No    Form completed by SUHA Olguin MA  Patient/or Parent of Patient instructed to wait 15 minutes after injection in the case of a allergic reaction.

## 2018-09-20 NOTE — MR AVS SNAPSHOT
After Visit Summary   9/20/2018    Marisol Steele    MRN: [id number]           Patient Information     Date Of Birth          8/8/1926        Visit Information        Provider Department      9/20/2018 8:00 AM Gem Cabello APRN [facility]        Today's Diagnoses     Routine general medical examination at a health care facility    -  1    Hypertension, goal below 150/90        Hyperlipidemia LDL goal <130        Asymptomatic postmenopausal status          Care Instructions      Preventive Health Recommendations    Female Ages 65 +    Yearly exam:     See your health care provider every year in order to  o Review health changes.   o Discuss preventive care.    o Review your medicines if your doctor has prescribed any.      You no longer need a yearly Pap test unless you've had an abnormal Pap test in the past 10 years. If you have vaginal symptoms, such as bleeding or discharge, be sure to talk with your provider about a Pap test.      Every 1 to 2 years, have a mammogram.  If you are over 69, talk with your health care provider about whether or not you want to continue having screening mammograms.      Every 10 years, have a colonoscopy. Or, have a yearly FIT test (stool test). These exams will check for colon cancer.       Have a cholesterol test every 5 years, or more often if your doctor advises it.       Have a diabetes test (fasting glucose) every three years. If you are at risk for diabetes, you should have this test more often.       At age 65, have a bone density scan (DEXA) to check for osteoporosis (brittle bone disease).    Shots:    Get a flu shot each year.    Get a tetanus shot every 10 years.    Talk to your doctor about your pneumonia vaccines. There are now two you should receive - Pneumovax (PPSV 23) and Prevnar (PCV 13).    Talk to your pharmacist about the shingles vaccine.    Talk to your doctor about the hepatitis B vaccine.    Nutrition:      Eat at least 5 servings of fruits and vegetables each day.      Eat whole-grain bread, whole-wheat pasta and brown rice instead of white grains and rice.      Get adequate Calcium and Vitamin D.     Lifestyle    Exercise at least 150 minutes a week (30 minutes a day, 5 days a week). This will help you control your weight and prevent disease.      Limit alcohol to one drink per day.      No smoking.       Wear sunscreen to prevent skin cancer.       See your dentist twice a year for an exam and cleaning.      See your eye doctor every 1 to 2 years to screen for conditions such as glaucoma, macular degeneration and cataracts.          Follow-ups after your visit        Additional Services     OPTOMETRY REFERRAL       Your provider has referred you to:  [facility] [place]: [facility] [place] [phone]    [email or web address]    Please be aware that coverage of these services is subject to the terms and limitations of your health insurance plan.  Call member services at your health plan with any benefit or coverage questions.      Please bring the following to your appointment:  >>   Any x-rays, CTs or MRIs which have been performed.  Contact the facility where they were done to arrange for  prior to your scheduled appointment.  Any new CT, MRI or other procedures ordered by your specialist must be performed at a [facility] facility or coordinated by your clinic's referral office.    >>   List of current medications   >>   This referral request   >>   Any documents/labs given to you for this referral                  Follow-up notes from your care team     Return in about 1 year (around 9/20/2019) for Physical Exam.      Who to contact     If you have questions or need follow up information about today's clinic visit or your schedule please contact [facility] directly at [phone].  Normal or non-critical lab and imaging results will be communicated to you by  "MyChart, letter or phone within 4 business days after the clinic has received the results. If you do not hear from us within 7 days, please contact the clinic through [place] [employer] or phone. If you have a critical or abnormal lab result, we will notify you by phone as soon as possible.  Submit refill requests through [facility] or call your pharmacy and they will forward the refill request to us. Please allow 3 business days for your refill to be completed.          Additional Information About Your Visit        [employer] [employer] Information     [facility] lets you send messages to your doctor, view your test results, renew your prescriptions, schedule appointments and more. To sign up, go to [email or web address] . Click on \"Log in\" on the left side of the screen, which will take you to the Welcome page. Then click on \"Sign up Now\" on the right side of the page.     You will be asked to enter the access code listed below, as well as some personal information. Please follow the directions to create your username and password.     Your access code is: [email or web address]  Expires: 2018  8:36 AM     Your access code will  in 90 days. If you need help or a new code, please call your [facility] clinic or [phone].        Care EveryWhere ID     This is your Care EveryWhere ID. This could be used by other organizations to access your [facility] medical records  [id number]        Your Vitals Were     Pulse Temperature Pulse Oximetry Breastfeeding? BMI (Body Mass Index)       86 97.3  F (36.3  C) (Oral) 98% No 21.29 kg/m2        Blood Pressure from Last 3 Encounters:   18 128/81   18 131/79   10/02/17 130/68    Weight from Last 3 Encounters:   18 109 lb (49.4 kg)   18 106 lb (48.1 kg)   10/02/17 106 lb (48.1 kg)              We Performed the Following     BASIC METABOLIC PANEL     Lipid panel reflex to direct LDL Fasting     OPTOMETRY REFERRAL          Today's Medication Changes          These changes are " accurate as of 9/20/18  8:36 AM.  If you have any questions, ask your nurse or doctor.               These medicines have changed or have updated prescriptions.        Dose/Directions    pravastatin 20 MG tablet   Commonly known as:  PRAVACHOL   This may have changed:  See the new instructions.   Used for:  Hyperlipidemia LDL goal <130   Changed by:  Gem Cabello APRN CNP        Dose:  20 mg   Take 1 tablet (20 mg) by mouth daily   Quantity:  90 tablet   Refills:  3            Where to get your medicines      These medications were sent to [facility] [address]  [address] [facility] [address]     Phone:  [phone]     amLODIPine 5 MG tablet    hydrochlorothiazide 12.5 MG Tabs tablet    pravastatin 20 MG tablet                Primary Care Provider Office Phone # Fax #    JASPER Bravo [phone] [address] [facility] [address]        Equal Access to Services     [facility] HospitalAL[facility] AH: Hadii aad ku hadasho Soomaali, waaxda luqadaha, qaybta kaalmada adeegyada, waxay idiin hayaan adeeg kharash francis . So [facility] [phone].    ATENCIÓN: Si habla español, tiene a phan disposición servicios gratuitos de asistencia lingüística. Llame al [phone].    We comply with applicable federal civil rights laws and [place] laws. We do not discriminate on the basis of race, color, national origin, age, disability, sex, sexual orientation, or gender identity.            Thank you!     Thank you for choosing [facility]  for your care. Our goal is always to provide you with excellent care. Hearing back from our patients is one way we can continue to improve our services. Please take a few minutes to complete the written survey that you may receive in the mail after your visit with us. Thank you!             Your Updated Medication List - Protect others around you: Learn how to safely use, store  and throw away your medicines at www.disposemymeds.org.          This list is accurate as of 9/20/18  8:36 AM.  Always use your most recent med list.                   Brand Name Dispense Instructions for use Diagnosis    amLODIPine 5 MG tablet    NORVASC    90 tablet    Take 1 tablet (5 mg) by mouth daily    Hypertension, goal below 150/90       aspirin 81 MG EC tablet           hydrochlorothiazide 12.5 MG Tabs tablet     90 tablet    Take 1 tablet (12.5 mg) by mouth daily    Hypertension, goal below 150/90       Multi-vitamin Tabs tablet   Generic drug:  multivitamin, therapeutic with minerals      1 TABLET DAILY        pravastatin 20 MG tablet    PRAVACHOL    90 tablet    Take 1 tablet (20 mg) by mouth daily    Hyperlipidemia LDL goal <130

## 2018-09-20 NOTE — LETTER
[facility]  [address] [place]  [address]  [phone]        September 21, 2018    Marisol Steele  [address] [place] [address]        Dear Marisol,    The results of your recent labs are enclosed.  Your labs look good.   I will see you in 1 year, or sooner as needed. Have a great fall!  Please call the clinic if you have any concerns.    Results for orders placed or performed in visit on 09/20/18   BASIC METABOLIC PANEL   Result Value Ref Range    Sodium 139 133 - 144 mmol/L    Potassium 3.4 3.4 - 5.3 mmol/L    Chloride 102 94 - 109 mmol/L    Carbon Dioxide 28 20 - 32 mmol/L    Anion Gap 9 3 - 14 mmol/L    Glucose 88 70 - 99 mg/dL    Urea Nitrogen 14 7 - 30 mg/dL    Creatinine 0.76 0.52 - 1.04 mg/dL    GFR Estimate 71 >60 mL/min/1.7m2    GFR Estimate If Black 86 >60 mL/min/1.7m2    Calcium 9.0 8.5 - 10.1 mg/dL   Lipid panel reflex to direct LDL Fasting   Result Value Ref Range    Cholesterol 152 <200 mg/dL    Triglycerides 82 <150 mg/dL    HDL Cholesterol 82 >49 mg/dL    LDL Cholesterol Calculated 54 <100 mg/dL    Non HDL Cholesterol 70 <130 mg/dL       If you have any questions please call the clinic at [phone].    Sincerely,    Gem HUTCHINSON CNP  LMD

## 2018-09-20 NOTE — PROGRESS NOTES
"  SUBJECTIVE:   Marisol Steele is a 89 year old female who presents for Preventive Visit.  {PVP to remind patient that this is not necessarily a physical exam; physical exam may or may not be done:484370::\"click delete button to remove this line now\"}  {PVP to inform patient that additional E&M charge may apply, if additional problems addressed:610263::\"click delete button to remove this line now\"}  Are you in the first 12 months of your Medicare Part B coverage?  {No Yes:992431::\"No\"}    Healthy Habits:    Do you get at least three servings of calcium containing foods daily (dairy, green leafy vegetables, etc.)? {YES/NO, DAIRY INTAKE:782096::\"yes\"}    Amount of exercise or daily activities, outside of work: {AMOUNT EXERCISE:096740}    Problems taking medications regularly {Yes /No default:806323::\"No\"}    Medication side effects: {Yes /No default.:908455::\"No\"}    Have you had an eye exam in the past two years? {YESNOBLANK:087143}    Do you see a dentist twice per year? {YESNOBLANK:094549}    Do you have sleep apnea, excessive snoring or daytime drowsiness?{YESNOBLANK:121209}      Ability to successfully perform activities of daily living: {YES/NO (MEDICARE):482358::\"Yes, no assistance needed\"}    Home safety:  {IPPE SAFETY CONCERNS:652504::\"none identified\"}     Hearing impairment: {NO/YES:226192}    Fall risk:  {Document Fall Risk in the Assessments Section of the Navigator:067006}    {If any of the above assessments are answered yes, consider ordering appropriate referrals (Optional):114694::\"click delete button to remove this line now\"}    {AWV Cognitive Screenin}    {Outside tests to abstract? :950984}    {additional problems to add (Optional):154018}    Reviewed and updated as needed this visit by clinical staff         Reviewed and updated as needed this visit by Provider        Social History   Substance Use Topics     Smoking status: Former Smoker     Types: Cigarettes     Quit date: 1990     " "Smokeless tobacco: Never Used     Alcohol use Yes      Comment: occ wine       If you drink alcohol do you typically have >3 drinks per day or >7 drinks per week? {ETOH :575744}                        Today's PHQ-2 Score:   PHQ-2 (  [employer]) 2016   Q1: Little interest or pleasure in doing things 0 0   Q2: Feeling down, depressed or hopeless 0 0   PHQ-2 Score 0 0     {PHQ-2 LOOK IN ASSESSMENTS (Optional) :583522}  Do you feel safe in your environment - {YES/NO/NA:672050}    Do you have a Health Care Directive?: {HEALTHCARE DIRECTIVE STATUS:414978}    Current providers sharing in care for this patient include:   Patient Care Team:  Gem Cabello APRN CNP as PCP - General (Nurse Practitioner - Adult Health)    The following health maintenance items are reviewed in Epic and correct as of today:  Health Maintenance   Topic Date Due     DEXA Q3 YR  1956     PHQ-2 Q1 YR  2017     EYE EXAM Q1 YEAR  10/19/2017     INFLUENZA VACCINE (1) 2018     BMP Q1 YR  10/02/2018     FALL RISK ASSESSMENT  2019     TETANUS IMMUNIZATION (SYSTEM ASSIGNED)  2020     ADVANCE DIRECTIVE PLANNING Q5 YRS  2021     PNEUMOCOCCAL  Completed     {Chronicprobdata (Optional):680415}    {Decision Support (Optional):406132}    ROS:  {ROS COMP:423947}    OBJECTIVE:   There were no vitals taken for this visit. Estimated body mass index is 20.7 kg/(m^2) as calculated from the following:    Height as of 3/12/18: 5' (1.524 m).    Weight as of 3/12/18: 106 lb (48.1 kg).  EXAM:   {Exam :411413}    {Diagnostic Test Results (Optional):261942::\"Diagnostic Test Results:\",\"none \"}    ASSESSMENT / PLAN:   {Diag Picklist:397513}    End of Life Planning:  Patient currently has an advanced directive: { :838236}    COUNSELING:  {Medicare Counselin}    BP Readings from Last 1 Encounters:   18 131/79     Estimated body mass index is 20.7 kg/(m^2) as calculated from the following:    Height as of " 3/12/18: 5' (1.524 m).    Weight as of 3/12/18: 106 lb (48.1 kg).    {BP Counseling- Complete if BP >= 120/80  (Optional):324537}  {Weight Management Plan (ACO) Complete if BMI is abnormal-  Ages 18-64  BMI >24.9.  Age 65+ with BMI <23 or >30 (Optional):532554}     reports that she quit smoking about 28 years ago. Her smoking use included Cigarettes. She has never used smokeless tobacco.  {Tobacco Cessation -- Complete if patient is a smoker (Optional):471340}    Appropriate preventive services were discussed with this patient, including applicable screening as appropriate for cardiovascular disease, diabetes, osteopenia/osteoporosis, and glaucoma.  As appropriate for age/gender, discussed screening for colorectal cancer, prostate cancer, breast cancer, and cervical cancer. Checklist reviewing preventive services available has been given to the patient.    Reviewed patients plan of care and provided an AVS. The {CarePlan:806615} for Marisol meets the Care Plan requirement. This Care Plan has been established and reviewed with the {PATIENT, FAMILY MEMBER, CAREGIVER:390191}.    Counseling Resources:  ATP IV Guidelines  Pooled Cohorts Equation Calculator  Breast Cancer Risk Calculator  FRAX Risk Assessment  ICSI Preventive Guidelines  Dietary Guidelines for Americans, 2010  USDA's MyPlate  ASA Prophylaxis  Lung CA Screening    JASPER Quintana [facility]

## 2018-09-20 NOTE — PROGRESS NOTES
SUBJECTIVE:   Marisol Steele is a 89 year old female who presents for Preventive Visit.  Are you in the first 12 months of your Medicare coverage?  No    HPI  Last seen in clinic March 2018 for pedal edema  Reduced Norvasc to 5 mg and edema has resolved  Denies any concerns today    She lives independently in a home  Does cooking, cleaning, yard work  Son and daughter live nearby  She denies any concerns with memory  Family have no expressed any concerns      Fall risk:       COGNITIVE SCREEN  1) Repeat 3 items (Leader, Season, Table)    2) Clock draw: NORMAL  3) 3 item recall: Recalls 1 object   Results: ABNORMAL clock, 1-2 items recalled: PROBABLE COGNITIVE IMPAIRMENT, **INFORM PROVIDER**    Mini-CogTM Copyright S Rick. Licensed by the author for use in [facility]; reprinted with permission ([email or web address]). All rights reserved.        Reviewed and updated as needed this visit by clinical staff         Reviewed and updated as needed this visit by Provider        Social History   Substance Use Topics     Smoking status: Former Smoker     Types: Cigarettes     Quit date: 7/6/1990     Smokeless tobacco: Never Used     Alcohol use Yes      Comment: occ wine           Today's PHQ-2 Score:   PHQ-2 ( 1999 [employer]) 7/25/2016   Q1: Little interest or pleasure in doing things 0   Q2: Feeling down, depressed or hopeless 0   PHQ-2 Score 0       Do you feel safe in your environment - Yes    Do you have a Health Care Directive?: Yes: Advance Directive has been received and scanned.    Current providers sharing in care for this patient include:   Patient Care Team:  Gem Cabello APRN CNP as PCP - General (Nurse Practitioner - Adult Health)    The following health maintenance items are reviewed in Epic and correct as of today:  Health Maintenance   Topic Date Due     DEXA Q3 YR  08/08/1956     PHQ-2 Q1 YR  07/25/2017     EYE EXAM Q1 YEAR  10/19/2017     INFLUENZA VACCINE (1) 09/01/2018     BMP Q1 YR   10/02/2018     FALL RISK ASSESSMENT  03/12/2019     TETANUS IMMUNIZATION (SYSTEM ASSIGNED)  07/06/2020     ADVANCE DIRECTIVE PLANNING Q5 YRS  09/13/2021     PNEUMOCOCCAL  Completed     Labs reviewed in EPIC  BP Readings from Last 3 Encounters:   09/20/18 128/81   03/12/18 131/79   10/02/17 130/68    Wt Readings from Last 3 Encounters:   09/20/18 109 lb (49.4 kg)   03/12/18 106 lb (48.1 kg)   10/02/17 106 lb (48.1 kg)                  Patient Active Problem List   Diagnosis     Hyperlipidemia LDL goal <130     Advanced directives, counseling/discussion     PVD (posterior vitreous detachment), left eye     Hypertension, goal below 150/90     Pseudophakia,ou     Bilateral dry eyes     Past Surgical History:   Procedure Laterality Date     APPENDECTOMY OPEN       CATARACT IOL, RT/LT       PHACOEMULSIFICATION WITH STANDARD INTRAOCULAR LENS IMPLANT Right 6/29/2015    Procedure: PHACOEMULSIFICATION WITH STANDARD INTRAOCULAR LENS IMPLANT;  Surgeon: Lisette Lazaro MD;  Location: [facility] OR     PHACOEMULSIFICATION WITH STANDARD INTRAOCULAR LENS IMPLANT Left 8/3/2015    Procedure: PHACOEMULSIFICATION WITH STANDARD INTRAOCULAR LENS IMPLANT;  Surgeon: Lisette Lazaro MD;  Location: [facility] OR       Social History   Substance Use Topics     Smoking status: Former Smoker     Types: Cigarettes     Quit date: 7/6/1990     Smokeless tobacco: Never Used     Alcohol use Yes      Comment: occ wine     Family History   Problem Relation Age of Onset     Hypertension Mother      Glaucoma Mother      HEART DISEASE Father      Cancer Paternal Grandmother      HEART DISEASE Paternal Grandfather      Cerebrovascular Disease Sister      Alcohol/Drug Son      Alcohol/Drug Sister      Respiratory Sister      Genitourinary Problems Sister      Cerebrovascular Disease Son      Hypertension Sister      Cerebrovascular Disease Sister      Macular Degeneration No family hx of            Pneumonia Vaccine: completed    Review of Systems  Constitutional,  HEENT, cardiovascular, pulmonary, gi and gu systems are negative, except as otherwise noted.    OBJECTIVE:   There were no vitals taken for this visit. Estimated body mass index is 20.7 kg/(m^2) as calculated from the following:    Height as of 3/12/18: 5' (1.524 m).    Weight as of 3/12/18: 106 lb (48.1 kg).  Physical Exam  GENERAL: healthy, alert and no distress  EYES: Eyes grossly normal to inspection, PERRL and conjunctivae and sclerae normal  HENT: ear canals and TM's normal, nose and mouth without ulcers or lesions  NECK: no adenopathy, no asymmetry, masses, or scars and thyroid normal to palpation  RESP: lungs clear to auscultation - no rales, rhonchi or wheezes  CV: regular rate and rhythm, normal S1 S2, no S3 or S4, no murmur, click or rub, no peripheral edema and peripheral pulses strong  ABDOMEN: soft, nontender, no hepatosplenomegaly, no masses and bowel sounds normal  MS: no gross musculoskeletal defects noted, no edema  SKIN: no suspicious lesions or rashes  NEURO: Normal strength and tone, mentation intact and speech normal  PSYCH: mentation appears normal, affect normal/bright    Diagnostic Test Results:  none     ASSESSMENT / PLAN:       ICD-10-CM    1. Routine general medical examination at a health care facility Z00.00 BASIC METABOLIC PANEL     OPTOMETRY REFERRAL     Lipid panel reflex to direct LDL Fasting   2. Hypertension, goal below 150/90 I10 amLODIPine (NORVASC) 5 MG tablet     hydrochlorothiazide 12.5 MG TABS tablet   3. Hyperlipidemia LDL goal <130 E78.5 pravastatin (PRAVACHOL) 20 MG tablet   4. Asymptomatic postmenopausal status Z78.0    5. Need for prophylactic vaccination and inoculation against influenza Z23 FLU VACCINE, INCREASED ANTIGEN, PRESV FREE, AGE 65+ [75724]     ADMIN INFLUENZA (For MEDICARE Patients ONLY) []     HTN well controlled. Do not want to induce hypotension, so continue to monitor. We had difficulty managing HTN last year, difficulty getting < 150/90 so I do not  want to cut back on medications today    Discussed DEXA scan. She declines. Would not want treatment    End of Life Planning:  Patient currently has an advanced directive: Yes.  Practitioner is supportive of decision.    COUNSELING:  Reviewed preventive health counseling, as reflected in patient instructions       Regular exercise       Healthy diet/nutrition       Vision screening       Osteoporosis Prevention/Bone Health    BP Readings from Last 1 Encounters:   03/12/18 131/79     Estimated body mass index is 20.7 kg/(m^2) as calculated from the following:    Height as of 3/12/18: 5' (1.524 m).    Weight as of 3/12/18: 106 lb (48.1 kg).           reports that she quit smoking about 28 years ago. Her smoking use included Cigarettes. She has never used smokeless tobacco.      Appropriate preventive services were discussed with this patient, including applicable screening as appropriate for cardiovascular disease, diabetes, osteopenia/osteoporosis, and glaucoma.  As appropriate for age/gender, discussed screening for colorectal cancer, prostate cancer, breast cancer, and cervical cancer. Checklist reviewing preventive services available has been given to the patient.    Reviewed patients plan of care and provided an AVS. The Basic Care Plan (routine screening as documented in Health Maintenance) for Marisol meets the Care Plan requirement. This Care Plan has been established and reviewed with the patient    Counseling Resources:  ATP IV Guidelines  Pooled Cohorts Equation Calculator  Breast Cancer Risk Calculator  FRAX Risk Assessment  ICSI Preventive Guidelines  Dietary Guidelines for Americans, 2010  USDA's MyPlate  ASA Prophylaxis  Lung CA Screening    JASPER Quintana CNP  [facility]

## 2018-09-21 NOTE — PROGRESS NOTES
[address] [address]  Phone: [phone]  Fax: [phone]      09/21/18    Marisol Steele  [address] [place] [address]  [facility] [address]      Dear Marisol,    The results of your recent labs are enclosed.  Your labs look good.   I will see you in 1 year, or sooner as needed. Have a great fall!  Please call the clinic if you have any concerns.    Sincerely,    JASPER Quintana CNP    Your [facility] Care Team

## 2018-10-25 ENCOUNTER — OFFICE VISIT (OUTPATIENT)
Dept: OPHTHALMOLOGY | Facility: CLINIC | Age: 83
End: 2018-10-25
Payer: COMMERCIAL

## 2018-10-25 DIAGNOSIS — H04.123 BILATERAL DRY EYES: ICD-10-CM

## 2018-10-25 DIAGNOSIS — H52.202 ASTIGMATISM OF LEFT EYE, UNSPECIFIED TYPE: ICD-10-CM

## 2018-10-25 DIAGNOSIS — H52.4 PRESBYOPIA: ICD-10-CM

## 2018-10-25 DIAGNOSIS — Z01.01 ENCOUNTER FOR EXAMINATION OF EYES AND VISION WITH ABNORMAL FINDINGS: Primary | ICD-10-CM

## 2018-10-25 DIAGNOSIS — H52.12 MYOPIA OF LEFT EYE: ICD-10-CM

## 2018-10-25 DIAGNOSIS — Z96.1 PSEUDOPHAKIA OF RIGHT EYE: ICD-10-CM

## 2018-10-25 DIAGNOSIS — H43.812 PVD (POSTERIOR VITREOUS DETACHMENT), LEFT EYE: ICD-10-CM

## 2018-10-25 PROCEDURE — 92015 DETERMINE REFRACTIVE STATE: CPT | Performed by: STUDENT IN AN ORGANIZED HEALTH CARE EDUCATION/TRAINING PROGRAM

## 2018-10-25 PROCEDURE — 92014 COMPRE OPH EXAM EST PT 1/>: CPT | Performed by: STUDENT IN AN ORGANIZED HEALTH CARE EDUCATION/TRAINING PROGRAM

## 2018-10-25 ASSESSMENT — REFRACTION_MANIFEST
OS_ADD: +3.00
OS_AXIS: 150
OD_CYLINDER: SPHERE
OS_CYLINDER: +1.00
OD_SPHERE: PLANO
OD_ADD: +3.00
OS_SPHERE: -1.00

## 2018-10-25 ASSESSMENT — SLIT LAMP EXAM - LIDS
COMMENTS: 2+ DERMATOCHALASIS
COMMENTS: 2+ DERMATOCHALASIS

## 2018-10-25 ASSESSMENT — REFRACTION_WEARINGRX
OD_AXIS: 180
OS_CYLINDER: +0.75
OS_ADD: +3.00
OD_SPHERE: -0.50
OD_CYLINDER: +1.00
OS_AXIS: 156
OD_ADD: +3.25
OS_SPHERE: -1.00
SPECS_TYPE: BIFOCAL

## 2018-10-25 ASSESSMENT — TONOMETRY
OS_IOP_MMHG: 15
IOP_METHOD: APPLANATION
OD_IOP_MMHG: 17

## 2018-10-25 ASSESSMENT — VISUAL ACUITY
OS_CC: 2+
OD_CC+: +1
OS_CC+: -1
OS_CC: 20/30
OD_CC: 2
OD_PH_CC: 20/40
OD_CC: 20/50
CORRECTION_TYPE: GLASSES
METHOD: SNELLEN - LINEAR

## 2018-10-25 ASSESSMENT — EXTERNAL EXAM - RIGHT EYE: OD_EXAM: NORMAL

## 2018-10-25 ASSESSMENT — CONF VISUAL FIELD
OD_NORMAL: 1
OS_NORMAL: 1

## 2018-10-25 ASSESSMENT — EXTERNAL EXAM - LEFT EYE: OS_EXAM: NORMAL

## 2018-10-25 ASSESSMENT — CUP TO DISC RATIO
OD_RATIO: 0.3
OS_RATIO: 0.2

## 2018-10-25 NOTE — LETTER
10/25/2018         RE: Marisol Steele  [address] [facility] [address]        Dear Colleague,    Thank you for referring your patient, Marisol Steele, to the [facility].    Her eye exam is stable.   Please see a copy of my visit note below.     Current Eye Medications:  Theratears both eyes as needed.  No eye vitamins.       Subjective:  Comprehensive Eye Exam.  No vision changes or concerns - glasses are working well.     Objective:  See Ophthalmology Exam.      Assessment:  Marisol Steele is a 89 year old female who presents with:     Pseudophakia,ou      PVD (posterior vitreous detachment), left eye      Bilateral dry eyes        Plan:  Glasses prescription given - optional  Continue artificial tears up to four times a day as needed     Lisette Lazaro MD  [phone]             Again, thank you for allowing me to participate in the care of your patient.        Sincerely,        Lisette Lazaro MD

## 2018-10-25 NOTE — MR AVS SNAPSHOT
After Visit Summary   10/25/2018    Marisol Steele    MRN: [id number]           Patient Information     Date Of Birth          8/8/1926        Visit Information        Provider Department      10/25/2018 8:00 AM Lisette Lazaro MD [facility]        Today's Diagnoses     Encounter for examination of eyes and vision with abnormal findings    -  1    Presbyopia        Myopia of left eye        Astigmatism of left eye, unspecified type        Pseudophakia,ou        PVD (posterior vitreous detachment), left eye        Bilateral dry eyes          Care Instructions    Glasses prescription given - optional  Continue artificial tears up to four times a day as needed     Lisette Lazaro MD  [phone]            Follow-ups after your visit        Follow-up notes from your care team     Return in about 1 year (around 10/25/2019) for Complete Exam.      Who to contact     If you have questions or need follow up information about today's clinic visit or your schedule please contact [facility] directly at [phone].  Normal or non-critical lab and imaging results will be communicated to you by MyChart, letter or phone within 4 business days after the clinic has received the results. If you do not hear from us within 7 days, please contact the clinic through [employer] or phone. If you have a critical or abnormal lab result, we will notify you by phone as soon as possible.  Submit refill requests through [employer] or call your pharmacy and they will forward the refill request to us. Please allow 3 business days for your refill to be completed.          Additional Information About Your Visit        Care EveryWhere ID     This is your Care EveryWhere ID. This could be used by other organizations to access your [facility] medical records  [id number]         Blood Pressure from Last 3 Encounters:   09/20/18 128/81   03/12/18 131/79   10/02/17 130/68    Weight from Last 3 Encounters:    09/20/18 49.4 kg (109 lb)   03/12/18 48.1 kg (106 lb)   10/02/17 48.1 kg (106 lb)              We Performed the Following     EYE EXAM (SIMPLE-NONBILLABLE)     REFRACTIVE STATUS        Primary Care Provider Office Phone # Fax JASPER Suarez [facility] [phone]       [address] [facility] [address]        Equal Access to Services     KADIE RACHEL : Hadii aad ku hadasho Soomaali, waaxda luqadaha, qaybta kaalmada adeegyada, waxay idiin hayaan adeeg kharash la'amaya . So [facility] [phone].    ATENCIÓN: Si habla español, tiene a phan disposición servicios gratuitos de asistencia lingüística. [facility] [phone].    We comply with applicable federal civil rights laws and [place] laws. We do not discriminate on the basis of race, color, national origin, age, disability, sex, sexual orientation, or gender identity.            Thank you!     Thank you for choosing [facility]  for your care. Our goal is always to provide you with excellent care. Hearing back from our patients is one way we can continue to improve our services. Please take a few minutes to complete the written survey that you may receive in the mail after your visit with us. Thank you!             Your Updated Medication List - Protect others around you: Learn how to safely use, store and throw away your medicines at www.disposemymeds.org.          This list is accurate as of 10/25/18  8:32 AM.  Always use your most recent med list.                   Brand Name Dispense Instructions for use Diagnosis    amLODIPine 5 MG tablet    NORVASC    90 tablet    Take 1 tablet (5 mg) by mouth daily    Hypertension, goal below 150/90       aspirin 81 MG EC tablet           hydrochlorothiazide 12.5 MG Tabs tablet     90 tablet    Take 1 tablet (12.5 mg) by mouth daily    Hypertension, goal below 150/90       Multi-vitamin Tabs tablet   Generic drug:  multivitamin, therapeutic with minerals      1 TABLET DAILY         pravastatin 20 MG tablet    PRAVACHOL    90 tablet    Take 1 tablet (20 mg) by mouth daily    Hyperlipidemia LDL goal <130

## 2018-10-25 NOTE — PROGRESS NOTES
Current Eye Medications:  Theratears both eyes as needed.  No eye vitamins.       Subjective:  Comprehensive Eye Exam.  No vision changes or concerns - glasses are working well.     Objective:  See Ophthalmology Exam.      Assessment:  Marisol Steele is a 89 year old female who presents with:     Pseudophakia,ou      PVD (posterior vitreous detachment), left eye      Bilateral dry eyes        Plan:  Glasses prescription given - optional  Continue artificial tears up to four times a day as needed     Lisette Lazaro MD  [phone]

## 2018-10-25 NOTE — PATIENT INSTRUCTIONS
Glasses prescription given - optional  Continue artificial tears up to four times a day as needed     Lisette Lazaro MD  [phone]

## 2019-06-14 ENCOUNTER — OFFICE VISIT (OUTPATIENT)
Dept: FAMILY MEDICINE | Facility: CLINIC | Age: 84
End: 2019-06-14
Payer: COMMERCIAL

## 2019-06-14 ENCOUNTER — ANCILLARY PROCEDURE (OUTPATIENT)
Dept: GENERAL RADIOLOGY | Facility: CLINIC | Age: 84
End: 2019-06-14
Attending: NURSE PRACTITIONER
Payer: COMMERCIAL

## 2019-06-14 VITALS
SYSTOLIC BLOOD PRESSURE: 136 MMHG | HEIGHT: 60 IN | HEART RATE: 96 BPM | BODY MASS INDEX: 21.2 KG/M2 | DIASTOLIC BLOOD PRESSURE: 79 MMHG | WEIGHT: 108 LBS | TEMPERATURE: 97.2 F | OXYGEN SATURATION: 93 %

## 2019-06-14 DIAGNOSIS — R05.9 COUGH: Primary | ICD-10-CM

## 2019-06-14 DIAGNOSIS — R05.9 COUGH: ICD-10-CM

## 2019-06-14 DIAGNOSIS — J18.9 PNEUMONIA OF LEFT LOWER LOBE DUE TO INFECTIOUS ORGANISM: ICD-10-CM

## 2019-06-14 PROCEDURE — 99214 OFFICE O/P EST MOD 30 MIN: CPT | Performed by: NURSE PRACTITIONER

## 2019-06-14 PROCEDURE — 71046 X-RAY EXAM CHEST 2 VIEWS: CPT

## 2019-06-14 RX ORDER — AZITHROMYCIN 250 MG/1
TABLET, FILM COATED ORAL
Qty: 6 TABLET | Refills: 0 | Status: SHIPPED | OUTPATIENT
Start: 2019-06-14 | End: 2019-08-20

## 2019-06-14 ASSESSMENT — PAIN SCALES - GENERAL: PAINLEVEL: NO PAIN (0)

## 2019-06-14 ASSESSMENT — MIFFLIN-ST. JEOR: SCORE: 821.38

## 2019-06-14 NOTE — PROGRESS NOTES
Subjective     Marisol Steele is a 89 year old female who presents to clinic today for the following health issues:    HPI   Acute Illness   Acute illness concerns: URI  Onset: 6 days    Fever: no    Chills/Sweats: YES- chills    Headache (location?): YES- form coughing so hard    Sinus Pressure:no    Conjunctivitis:  no    Ear Pain: no    Rhinorrhea: no    Congestion: YES- chest    Sore Throat: no     Cough: YES-productive of yellow sputum, productive of green sputum    Wheeze: no    Decreased Appetite: YES    Nausea: no    Vomiting: no    Diarrhea:  no    Dysuria/Freq.: no    Fatigue/Achiness: YES    Sick/Strep Exposure: YES- family     Therapies Tried and outcome: Aspirin    She has been sick for 1 1/2 weeks  Initially had a sore throat which has since resolve  Now throat is irritated which she thinks is from coughing  She is coughing up a lot of colored sputum  Denies hemoptysis  Feels worn out  Decreased appetite  No fevers  Denies SOB        Patient Active Problem List   Diagnosis     Hyperlipidemia LDL goal <130     Advanced directives, counseling/discussion     PVD (posterior vitreous detachment), left eye     Hypertension, goal below 150/90     Pseudophakia,ou     Bilateral dry eyes     Past Surgical History:   Procedure Laterality Date     APPENDECTOMY OPEN       CATARACT IOL, RT/LT       PHACOEMULSIFICATION WITH STANDARD INTRAOCULAR LENS IMPLANT Right 2015    Procedure: PHACOEMULSIFICATION WITH STANDARD INTRAOCULAR LENS IMPLANT;  Surgeon: Lisette Lazaro MD;  Location:  OR     PHACOEMULSIFICATION WITH STANDARD INTRAOCULAR LENS IMPLANT Left 8/3/2015    Procedure: PHACOEMULSIFICATION WITH STANDARD INTRAOCULAR LENS IMPLANT;  Surgeon: Lisette Lazaro MD;  Location:  OR       Social History     Tobacco Use     Smoking status: Former Smoker     Types: Cigarettes     Last attempt to quit: 1990     Years since quittin.9     Smokeless tobacco: Never Used   Substance Use Topics     Alcohol  use: Yes     Comment: occ wine     Family History   Problem Relation Age of Onset     Hypertension Mother      Glaucoma Mother      Heart Disease Father      Cancer Paternal Grandmother      Heart Disease Paternal Grandfather      Cerebrovascular Disease Sister      Alcohol/Drug Son      Alcohol/Drug Sister      Respiratory Sister      Genitourinary Problems Sister      Cerebrovascular Disease Son      Hypertension Sister      Cerebrovascular Disease Sister      Macular Degeneration No family hx of              Reviewed and updated as needed this visit by Provider         Review of Systems   ROS COMP: Constitutional, HEENT, cardiovascular, pulmonary, gi and gu systems are negative, except as otherwise noted.      Objective    /79 (BP Location: Right arm, Patient Position: Chair, Cuff Size: Adult Small)   Pulse 96   Temp 97.2  F (36.2  C) (Oral)   Ht 1.524 m (5')   Wt 49 kg (108 lb)   SpO2 93%   Breastfeeding? No   BMI 21.09 kg/m    Body mass index is 21.09 kg/m .  Physical Exam   GENERAL: healthy, alert and no distress  HENT: normal cephalic/atraumatic, ear canals and TM's normal, nose and mouth without ulcers or lesions, nasal mucosa edematous , rhinorrhea clear, oropharynx clear, oral mucous membranes moist and sinuses: not tender  NECK: no adenopathy, no asymmetry, masses, or scars and thyroid normal to palpation  RESP: Crackles in LLL, no wheezes, no labored breathing  CV: regular rate and rhythm, normal S1 S2, no S3 or S4, no murmur, click or rub, no peripheral edema     Diagnostic Test Results:  Chest x-ray: no infiltrate, await formal radiology read        Assessment & Plan       ICD-10-CM    1. Cough R05 XR Chest 2 Views   2. Pneumonia of left lower lobe due to infectious organism (H) J18.1 azithromycin (ZITHROMAX) 250 MG tablet        Concern for developing pneumonia and may be too soon to see on x-ray  Higher risk for developing pneumonia  Needs close follow up if symptoms not  improving  Advised to follow up with one of my partners next week to monitor for symptom resolution, but if she is feeling much better does not need office visit  If symptoms worsening over the weekend, should be seen right away      Patient Instructions   Start taking the antibiotic today. Take 2 tablets today and 1 tablet daily for the following 4 days  You should notice an improvement within 24-48 hours  If you do not notice an improvement, or if you are getting worse, you need to be seen right away, whether at an emergency room or urgent care over the weekend  If you are feeling worse, and you are short of breath you should go to an emergency room. Have your son drive you or call 911    Go home and rest and drink plenty of fluids    If you are feeling much better by next week then it is ok to not follow up  If you are only feeling a little bit better by Monday, then please call clinic to schedule a visit with one of my partners      JASPER Quintana [facility]

## 2019-07-23 ENCOUNTER — DOCUMENTATION ONLY (OUTPATIENT)
Dept: OPHTHALMOLOGY | Facility: CLINIC | Age: 84
End: 2019-07-23

## 2019-08-20 ENCOUNTER — OFFICE VISIT (OUTPATIENT)
Dept: FAMILY MEDICINE | Facility: CLINIC | Age: 84
End: 2019-08-20
Payer: COMMERCIAL

## 2019-08-20 VITALS
HEART RATE: 87 BPM | SYSTOLIC BLOOD PRESSURE: 144 MMHG | DIASTOLIC BLOOD PRESSURE: 72 MMHG | TEMPERATURE: 97.2 F | BODY MASS INDEX: 20.51 KG/M2 | WEIGHT: 105 LBS

## 2019-08-20 DIAGNOSIS — E78.5 HYPERLIPIDEMIA LDL GOAL <130: ICD-10-CM

## 2019-08-20 DIAGNOSIS — I10 HYPERTENSION, GOAL BELOW 150/90: ICD-10-CM

## 2019-08-20 LAB
ANION GAP SERPL CALCULATED.3IONS-SCNC: 8 MMOL/L (ref 3–14)
BUN SERPL-MCNC: 12 MG/DL (ref 7–30)
CALCIUM SERPL-MCNC: 9.7 MG/DL (ref 8.5–10.1)
CHLORIDE SERPL-SCNC: 105 MMOL/L (ref 94–109)
CHOLEST SERPL-MCNC: 159 MG/DL
CO2 SERPL-SCNC: 27 MMOL/L (ref 20–32)
CREAT SERPL-MCNC: 0.72 MG/DL (ref 0.52–1.04)
GFR SERPL CREATININE-BSD FRML MDRD: 72 ML/MIN/{1.73_M2}
GLUCOSE SERPL-MCNC: 100 MG/DL (ref 70–99)
HDLC SERPL-MCNC: 69 MG/DL
LDLC SERPL CALC-MCNC: 72 MG/DL
NONHDLC SERPL-MCNC: 90 MG/DL
POTASSIUM SERPL-SCNC: 3.3 MMOL/L (ref 3.4–5.3)
SODIUM SERPL-SCNC: 140 MMOL/L (ref 133–144)
TRIGL SERPL-MCNC: 88 MG/DL

## 2019-08-20 PROCEDURE — 80048 BASIC METABOLIC PNL TOTAL CA: CPT | Performed by: NURSE PRACTITIONER

## 2019-08-20 PROCEDURE — 80061 LIPID PANEL: CPT | Performed by: NURSE PRACTITIONER

## 2019-08-20 PROCEDURE — 36415 COLL VENOUS BLD VENIPUNCTURE: CPT | Performed by: NURSE PRACTITIONER

## 2019-08-20 PROCEDURE — 99213 OFFICE O/P EST LOW 20 MIN: CPT | Performed by: NURSE PRACTITIONER

## 2019-08-20 RX ORDER — PRAVASTATIN SODIUM 20 MG
20 TABLET ORAL DAILY
Qty: 90 TABLET | Refills: 3 | Status: SHIPPED | OUTPATIENT
Start: 2019-08-20 | End: 2020-09-09

## 2019-08-20 RX ORDER — HYDROCHLOROTHIAZIDE 12.5 MG/1
12.5 TABLET ORAL DAILY
Qty: 90 TABLET | Refills: 3 | Status: SHIPPED | OUTPATIENT
Start: 2019-08-20 | End: 2020-09-09

## 2019-08-20 RX ORDER — AMLODIPINE BESYLATE 5 MG/1
5 TABLET ORAL DAILY
Qty: 90 TABLET | Refills: 3 | Status: SHIPPED | OUTPATIENT
Start: 2019-08-20 | End: 2020-09-09

## 2019-08-20 NOTE — PROGRESS NOTES
"Subjective     Marisol Steele is a 89 year old female who presents to clinic today for the following health issues:    HPI   Blood pressure and cholesterol check  She is here for refills of her medications and fasting labs  I saw her for pneumonia in  and that resolved after antibiotic  BPs have been well controlled  She states she knew it would elevated today because she is \"going through some family stuff\" that she did not want to elaborate on  She denies other concerns today    She had Zostavax in     Patient Active Problem List   Diagnosis     Hyperlipidemia LDL goal <130     Advanced directives, counseling/discussion     PVD (posterior vitreous detachment), left eye     Hypertension, goal below 150/90     Pseudophakia,ou     Bilateral dry eyes     Past Surgical History:   Procedure Laterality Date     APPENDECTOMY OPEN       CATARACT IOL, RT/LT       PHACOEMULSIFICATION WITH STANDARD INTRAOCULAR LENS IMPLANT Right 2015    Procedure: PHACOEMULSIFICATION WITH STANDARD INTRAOCULAR LENS IMPLANT;  Surgeon: Lisette Lazaro MD;  Location: [facility] OR     PHACOEMULSIFICATION WITH STANDARD INTRAOCULAR LENS IMPLANT Left 8/3/2015    Procedure: PHACOEMULSIFICATION WITH STANDARD INTRAOCULAR LENS IMPLANT;  Surgeon: Lisette Lazaro MD;  Location: [facility] OR       Social History     Tobacco Use     Smoking status: Former Smoker     Types: Cigarettes     Last attempt to quit: 1990     Years since quittin.1     Smokeless tobacco: Never Used   Substance Use Topics     Alcohol use: Yes     Comment: occ wine     Family History   Problem Relation Age of Onset     Hypertension Mother      Glaucoma Mother      Heart Disease Father      Cancer Paternal Grandmother      Heart Disease Paternal Grandfather      Cerebrovascular Disease Sister      Alcohol/Drug Son      Alcohol/Drug Sister      Respiratory Sister      Genitourinary Problems Sister      Cerebrovascular Disease Son      Hypertension Sister      " Cerebrovascular Disease Sister      Macular Degeneration No family hx of              Reviewed and updated as needed this visit by Provider         Review of Systems   ROS COMP: Constitutional, HEENT, cardiovascular, pulmonary, gi and gu systems are negative, except as otherwise noted.      Objective    BP (!) 144/72   Pulse 87   Temp 97.2  F (36.2  C) (Oral)   Wt 105 lb (47.6 kg)   BMI 20.51 kg/m    Body mass index is 20.51 kg/m .  Physical Exam   GENERAL: healthy, alert and no distress  RESP: lungs clear to auscultation - no rales, rhonchi or wheezes  CV: regular rate and rhythm, normal S1 S2, no S3 or S4, no murmur, click or rub, no peripheral edema and peripheral pulses strong    Diagnostic Test Results:  Labs reviewed in Epic        Assessment & Plan       ICD-10-CM    1. Hypertension, goal below 150/90 I10 amLODIPine (NORVASC) 5 MG tablet     hydrochlorothiazide (HYDRODIURIL) 12.5 MG tablet     Basic metabolic panel   2. Hyperlipidemia LDL goal <130 E78.5 pravastatin (PRAVACHOL) 20 MG tablet     Lipid panel reflex to direct LDL Fasting        BP improved upon recheck  Continue with same medications  We discussed indication for Shingrix vaccine and she will check on cost at her preferred pharmacy    JASPER Quintana [facility]

## 2019-08-20 NOTE — LETTER
[facility]  [address] [place]  [address]  [phone]        August 22, 2019    Marisol Steele  [address] [place] [address]        Dear Marisol,    The results of your recent labs are enclosed.   Your labs look good. Cholesterol and kidney function are stable. Your potassium is just slightly below normal. Work on increasing potassium in your diet.   I hope you enjoy the rest of your summer and things work out with your family.   Please call the clinic if you have any concerns.     Results for orders placed or performed in visit on 08/20/19   Basic metabolic panel   Result Value Ref Range    Sodium 140 133 - 144 mmol/L    Potassium 3.3 (L) 3.4 - 5.3 mmol/L    Chloride 105 94 - 109 mmol/L    Carbon Dioxide 27 20 - 32 mmol/L    Anion Gap 8 3 - 14 mmol/L    Glucose 100 (H) 70 - 99 mg/dL    Urea Nitrogen 12 7 - 30 mg/dL    Creatinine 0.72 0.52 - 1.04 mg/dL    GFR Estimate 72 >60 mL/min/[1.73_m2]    GFR Estimate If Black 83 >60 mL/min/[1.73_m2]    Calcium 9.7 8.5 - 10.1 mg/dL   Lipid panel reflex to direct LDL Fasting   Result Value Ref Range    Cholesterol 159 <200 mg/dL    Triglycerides 88 <150 mg/dL    HDL Cholesterol 69 >49 mg/dL    LDL Cholesterol Calculated 72 <100 mg/dL    Non HDL Cholesterol 90 <130 mg/dL   If you have any questions please call the clinic at [phone].  Sincerely,  Gem HUTCHINSON CNP  LMD

## 2019-08-22 NOTE — RESULT ENCOUNTER NOTE
[address] [address]  Phone: [phone]  Fax: [phone]      08/22/19    Marisol Steele  [address] [facility]  [place] [address]      Dear Marisol,    The results of your recent labs are enclosed.  Your labs look good. Cholesterol and kidney function are stable. Your potassium is just slightly below normal. Work on increasing potassium in your diet.   I hope you enjoy the rest of your summer and things work out with your family.   Please call the clinic if you have any concerns.    Sincerely,    JASPER Quintana CNP    Your [facility] Care Team

## 2019-12-30 ENCOUNTER — OFFICE VISIT (OUTPATIENT)
Dept: FAMILY MEDICINE | Facility: CLINIC | Age: 84
End: 2019-12-30
Payer: COMMERCIAL

## 2019-12-30 VITALS
BODY MASS INDEX: 22.58 KG/M2 | OXYGEN SATURATION: 95 % | TEMPERATURE: 96.5 F | SYSTOLIC BLOOD PRESSURE: 144 MMHG | WEIGHT: 115 LBS | HEIGHT: 60 IN | HEART RATE: 94 BPM | DIASTOLIC BLOOD PRESSURE: 82 MMHG

## 2019-12-30 DIAGNOSIS — I10 HYPERTENSION, GOAL BELOW 150/90: Primary | ICD-10-CM

## 2019-12-30 DIAGNOSIS — E78.5 HYPERLIPIDEMIA LDL GOAL <130: ICD-10-CM

## 2019-12-30 DIAGNOSIS — R06.02 SOB (SHORTNESS OF BREATH): ICD-10-CM

## 2019-12-30 LAB — HGB BLD-MCNC: 15.1 G/DL (ref 11.7–15.7)

## 2019-12-30 PROCEDURE — 99214 OFFICE O/P EST MOD 30 MIN: CPT | Performed by: NURSE PRACTITIONER

## 2019-12-30 PROCEDURE — 85018 HEMOGLOBIN: CPT | Performed by: NURSE PRACTITIONER

## 2019-12-30 PROCEDURE — 36415 COLL VENOUS BLD VENIPUNCTURE: CPT | Performed by: NURSE PRACTITIONER

## 2019-12-30 ASSESSMENT — MIFFLIN-ST. JEOR: SCORE: 848.14

## 2019-12-30 NOTE — LETTER
[address] [place] [address]    December 30, 2019    Marisol Steele  [address] [place] [address]          Dear Marisol,    Your results are normal    Enclosed is a copy of your results.     Results for orders placed or performed in visit on 12/30/19   Hemoglobin     Status: None   Result Value Ref Range    Hemoglobin 15.1 11.7 - 15.7 g/dL       If you have any questions or concerns, please call myself or my nurse at [phone].      Sincerely,        Rita Sloan CNP/ha

## 2019-12-30 NOTE — PROGRESS NOTES
Subjective     Marisol Steele is a 89 year old female who presents to clinic today for the following health issues:    HPI   Hypertension Follow-up      Do you check your blood pressure regularly outside of the clinic? No     Are you following a low salt diet? mostly    Are your blood pressures ever more than 140 on the top number (systolic) OR more   than 90 on the bottom number (diastolic), for example 140/90? unknown      How many servings of fruits and vegetables do you eat daily?  2-3    On average, how many sweetened beverages do you drink each day (Examples: soda, juice, sweet tea, etc.  Do NOT count diet or artificially sweetened beverages)?   2-3    How many days per week do you miss taking your medication? 0    Hyperlipidemia Follow-Up      Are you regularly taking any medication or supplement to lower your cholesterol?   Yes- Pravastatin    Are you having muscle aches or other side effects that you think could be caused by your cholesterol lowering medication?  No    Patient notes some mild shortness of breath with insidious onset over the past couple years. Has caused her to stop doing shoveling but still walks regularly. No ankle edema, chest pain.    Reviewed and updated as needed this visit by Provider         Review of Systems   ROS COMP: Constitutional, HEENT, cardiovascular, pulmonary, gi and gu systems are negative, except as otherwise noted.      Objective    BP (!) 144/82 (BP Location: Left arm, Patient Position: Chair, Cuff Size: Adult Regular)   Pulse 94   Temp 96.5  F (35.8  C) (Oral)   Ht 1.524 m (5')   Wt 52.2 kg (115 lb)   SpO2 95%   BMI 22.46 kg/m    Body mass index is 22.46 kg/m .  Physical Exam   GENERAL: healthy, alert and no distress  RESP: lungs clear to auscultation - no rales, rhonchi or wheezes  CV: regular rate and rhythm, normal S1 S2, no S3 or S4, no murmur, click or rub, no peripheral edema and peripheral pulses strong    Diagnostic Test Results:  Labs reviewed in  Epic  Results for orders placed or performed in visit on 12/30/19   Hemoglobin     Status: None   Result Value Ref Range    Hemoglobin 15.1 11.7 - 15.7 g/dL           Assessment & Plan     1. Hypertension, goal below 150/90  Well controlled continue, current medications without change.    2. Hyperlipidemia LDL goal <130  Reviewed limited evidence for statin use after age 80- patient will consider whether she wants to continue or not.   Recommend she stop ASA.    3. SOB (shortness of breath)  Very mild and no alarm symptoms- will screen for anemia and if negative, defer further work-up  - Hemoglobin       See Patient Instructions    Return in about 8 months (around 8/30/2020) for Wellness check.    JASPER Fagan [facility]

## 2020-08-28 NOTE — PROGRESS NOTES
"  SUBJECTIVE:   Marisol Steele is a 89 year old female who presents for Preventive Visit.    Are you in the first 12 months of your Medicare Part B coverage?  No    Physical Health:    In general, how would you rate your overall physical health? good    Outside of work, how many days during the week do you exercise? 1 day/week    Outside of work, approximately how many minutes a day do you exercise?30-45 minutes    If you drink alcohol do you typically have >3 drinks per day or >7 drinks per week? No    Do you usually eat at least 4 servings of fruit and vegetables a day, include whole grains & fiber and avoid regularly eating high fat or \"junk\" foods? Yes    Do you have any problems taking medications regularly?  No    Do you have any side effects from medications? none    Needs assistance for the following daily activities: no assistance needed    Which of the following safety concerns are present in your home?  none identified     Hearing impairment: Yes, Difficulty following a conversation in a noisy restaurant or crowded room.    In the past 6 months, have you been bothered by leaking of urine? yes    Mental Health:    In general, how would you rate your overall mental or emotional health? good  PHQ-2 Score:      Do you feel safe in your environment? Yes    Have you ever done Advance Care Planning? (For example, a Health Directive, POLST, or a discussion with a medical provider or your loved ones about your wishes): Yes, advance care planning is on file.    Additional concerns to address?  No    Fall risk:  Fallen 2 or more times in the past year?: No  Any fall with injury in the past year?: No    Cognitive Screenin) Repeat 3 items (Leader, Season, Table)    2) Clock draw: NORMAL  3) 3 item recall: Recalls 2 objects   Results: NORMAL clock, 1-2 items recalled: COGNITIVE IMPAIRMENT LESS LIKELY    Mini-CogTM Copyright TRACI Cabrera. Licensed by the author for use in [facility]; reprinted with " permission ([email or web address]). All rights reserved.      Do you have sleep apnea, excessive snoring or daytime drowsiness?: no        Hyperlipidemia Follow-Up      Are you regularly taking any medication or supplement to lower your cholesterol?   Yes- Pravastatin    Are you having muscle aches or other side effects that you think could be caused by your cholesterol lowering medication?  No    Hypertension Follow-up      Do you check your blood pressure regularly outside of the clinic? No     Are you following a low salt diet? Yes    Are your blood pressures ever more than 140 on the top number (systolic) OR more   than 90 on the bottom number (diastolic), for example 140/90? No      Reviewed and updated as needed this visit by clinical staff  Tobacco  Allergies  Meds         Reviewed and updated as needed this visit by Provider        Social History     Tobacco Use     Smoking status: Former Smoker     Types: Cigarettes     Last attempt to quit: 1990     Years since quittin.2     Smokeless tobacco: Never Used   Substance Use Topics     Alcohol use: Yes     Comment: occ wine                           Current providers sharing in care for this patient include:   Patient Care Team:  Rita Sloan APRN CNP as PCP - General (Nurse Practitioner)  Rita Sloan APRN CNP as Assigned PCP    The following health maintenance items are reviewed in Epic and correct as of today:  Health Maintenance   Topic Date Due     DEXA  1926     ZOSTER IMMUNIZATION (2 of 3) 2006     EYE EXAM  10/25/2019     PHQ-2  2020     DTAP/TDAP/TD IMMUNIZATION (2 - Td) 2020     BMP  2020     INFLUENZA VACCINE (1) 2020     FALL RISK ASSESSMENT  2020     MEDICARE ANNUAL WELLNESS VISIT  2021     ADVANCE CARE PLANNING  2021     PNEUMOCOCCAL IMMUNIZATION 65+ LOW/MEDIUM RISK  Completed     IPV IMMUNIZATION  Aged Out     MENINGITIS IMMUNIZATION  Aged Out     HEPATITIS B  "IMMUNIZATION  Aged Out     BP Readings from Last 3 Encounters:   09/09/20 136/80   12/30/19 (!) 144/82   08/20/19 (!) 144/72    Wt Readings from Last 3 Encounters:   09/09/20 53.1 kg (117 lb)   12/30/19 52.2 kg (115 lb)   08/20/19 47.6 kg (105 lb)                      ROS:  Constitutional, HEENT, cardiovascular, pulmonary, GI, , musculoskeletal, neuro, skin, endocrine and psych systems are negative, except as otherwise noted.    OBJECTIVE:   /80 (BP Location: Right arm, Patient Position: Chair, Cuff Size: Adult Regular)   Pulse 97   Temp 97.4  F (36.3  C) (Oral)   Ht 1.518 m (4' 11.75\")   Wt 53.1 kg (117 lb)   SpO2 95%   BMI 23.04 kg/m   Estimated body mass index is 23.04 kg/m  as calculated from the following:    Height as of this encounter: 1.518 m (4' 11.75\").    Weight as of this encounter: 53.1 kg (117 lb).  EXAM:   GENERAL: healthy, alert and no distress  EYES: Eyes grossly normal to inspection, PERRL and conjunctivae and sclerae normal  HENT: ear canals and TM's normal, nose and mouth without ulcers or lesions  NECK: no adenopathy, no asymmetry, masses, or scars and thyroid normal to palpation  RESP: lungs clear to auscultation - no rales, rhonchi or wheezes  BREAST: normal without masses, tenderness or nipple discharge and no palpable axillary masses or adenopathy  CV: regular rate and rhythm, normal S1 S2, no S3 or S4, no murmur, click or rub, no peripheral edema and peripheral pulses strong  ABDOMEN: soft, nontender, no hepatosplenomegaly, no masses and bowel sounds normal  MS: no gross musculoskeletal defects noted, no edema  SKIN: no suspicious lesions or rashes  NEURO: Normal strength and tone, mentation intact and speech normal  PSYCH: mentation appears normal, affect normal/bright    Diagnostic Test Results:  Labs reviewed in Epic  No results found for any visits on 09/09/20.    ASSESSMENT / PLAN:   1. Encounter for Medicare annual wellness exam      2. Hypertension, goal below " "150/90  Well controlled, continue medications and labs today  - BASIC METABOLIC PANEL  - amLODIPine (NORVASC) 5 MG tablet; Take 1 tablet (5 mg) by mouth daily  Dispense: 90 tablet; Refill: 3  - hydrochlorothiazide (HYDRODIURIL) 12.5 MG tablet; Take 1 tablet (12.5 mg) by mouth daily  Dispense: 90 tablet; Refill: 3    3. Hyperlipidemia LDL goal <130  Reviewed pros/cons of continuing statin therapy in 90's and patient elects to stop at this time.  - Lipid panel reflex to direct LDL Fasting    4. Need for prophylactic vaccination and inoculation against influenza    - FLUZONE HIGH DOSE 65+  [70974]  - ADMIN INFLUENZA (For MEDICARE Patients ONLY) []    COUNSELING:  Reviewed preventive health counseling, as reflected in patient instructions       Regular exercise       Healthy diet/nutrition       Vision screening       Hearing screening       Dental care       Bladder control       Immunizations    Vaccinated for: Influenza and TDAP          Estimated body mass index is 23.04 kg/m  as calculated from the following:    Height as of this encounter: 1.518 m (4' 11.75\").    Weight as of this encounter: 53.1 kg (117 lb).        She reports that she quit smoking about 30 years ago. Her smoking use included cigarettes. She has never used smokeless tobacco.    Appropriate preventive services were discussed with this patient, including applicable screening as appropriate for cardiovascular disease, diabetes, osteopenia/osteoporosis, and glaucoma.  As appropriate for age/gender, discussed screening for colorectal cancer, prostate cancer, breast cancer, and cervical cancer. Checklist reviewing preventive services available has been given to the patient.    Reviewed patients plan of care and provided an AVS. The Basic Care Plan (routine screening as documented in Health Maintenance) for Marisol meets the Care Plan requirement. This Care Plan has been established and reviewed with the Patient.    Counseling Resources:  ATP IV " Guidelines  Pooled Cohorts Equation Calculator  Breast Cancer Risk Calculator  BRCA-Related Cancer Risk Assessment: FHS-7 Tool  FRAX Risk Assessment  ICSI Preventive Guidelines  Dietary Guidelines for Americans, 2010  USDA's MyPlate  ASA Prophylaxis  Lung CA Screening    JASPER Fagan CNP  [facility]

## 2020-08-28 NOTE — PATIENT INSTRUCTIONS
Patient Education   Personalized Prevention Plan  You are due for the preventive services outlined below.  Your care team is available to assist you in scheduling these services.  If you have already completed any of these items, please share that information with your care team to update in your medical record.  Health Maintenance Due   Topic Date Due     Osteoporosis Screening  08/08/1926     Zoster (Shingles) Vaccine (2 of 3) 12/27/2006     Annual Wellness Visit  09/20/2019     Eye Exam  10/25/2019     PHQ-2  01/01/2020     Diptheria Tetanus Pertussis (DTAP/TDAP/TD) Vaccine (2 - Td) 07/06/2020     Basic Metabolic Panel  08/20/2020     Flu Vaccine (1) 09/01/2020

## 2020-09-09 ENCOUNTER — OFFICE VISIT (OUTPATIENT)
Dept: FAMILY MEDICINE | Facility: CLINIC | Age: 85
End: 2020-09-09
Payer: COMMERCIAL

## 2020-09-09 VITALS
WEIGHT: 117 LBS | SYSTOLIC BLOOD PRESSURE: 136 MMHG | DIASTOLIC BLOOD PRESSURE: 80 MMHG | BODY MASS INDEX: 22.97 KG/M2 | OXYGEN SATURATION: 95 % | HEIGHT: 60 IN | TEMPERATURE: 97.4 F | HEART RATE: 97 BPM

## 2020-09-09 DIAGNOSIS — E78.5 HYPERLIPIDEMIA LDL GOAL <130: ICD-10-CM

## 2020-09-09 DIAGNOSIS — Z23 NEED FOR PROPHYLACTIC VACCINATION AND INOCULATION AGAINST INFLUENZA: ICD-10-CM

## 2020-09-09 DIAGNOSIS — Z00.00 ENCOUNTER FOR MEDICARE ANNUAL WELLNESS EXAM: Primary | ICD-10-CM

## 2020-09-09 DIAGNOSIS — I10 HYPERTENSION, GOAL BELOW 150/90: ICD-10-CM

## 2020-09-09 LAB
ANION GAP SERPL CALCULATED.3IONS-SCNC: 6 MMOL/L (ref 3–14)
BUN SERPL-MCNC: 18 MG/DL (ref 7–30)
CALCIUM SERPL-MCNC: 9.2 MG/DL (ref 8.5–10.1)
CHLORIDE SERPL-SCNC: 103 MMOL/L (ref 94–109)
CHOLEST SERPL-MCNC: 160 MG/DL
CO2 SERPL-SCNC: 30 MMOL/L (ref 20–32)
CREAT SERPL-MCNC: 0.73 MG/DL (ref 0.52–1.04)
GFR SERPL CREATININE-BSD FRML MDRD: 70 ML/MIN/{1.73_M2}
GLUCOSE SERPL-MCNC: 90 MG/DL (ref 70–99)
HDLC SERPL-MCNC: 69 MG/DL
LDLC SERPL CALC-MCNC: 70 MG/DL
NONHDLC SERPL-MCNC: 91 MG/DL
POTASSIUM SERPL-SCNC: 3.4 MMOL/L (ref 3.4–5.3)
SODIUM SERPL-SCNC: 139 MMOL/L (ref 133–144)
TRIGL SERPL-MCNC: 107 MG/DL

## 2020-09-09 PROCEDURE — 80048 BASIC METABOLIC PNL TOTAL CA: CPT | Performed by: NURSE PRACTITIONER

## 2020-09-09 PROCEDURE — 36415 COLL VENOUS BLD VENIPUNCTURE: CPT | Performed by: NURSE PRACTITIONER

## 2020-09-09 PROCEDURE — 90472 IMMUNIZATION ADMIN EACH ADD: CPT | Performed by: NURSE PRACTITIONER

## 2020-09-09 PROCEDURE — 90715 TDAP VACCINE 7 YRS/> IM: CPT | Performed by: NURSE PRACTITIONER

## 2020-09-09 PROCEDURE — 80061 LIPID PANEL: CPT | Performed by: NURSE PRACTITIONER

## 2020-09-09 PROCEDURE — 90471 IMMUNIZATION ADMIN: CPT | Performed by: NURSE PRACTITIONER

## 2020-09-09 PROCEDURE — 99397 PER PM REEVAL EST PAT 65+ YR: CPT | Mod: 25 | Performed by: NURSE PRACTITIONER

## 2020-09-09 PROCEDURE — 90662 IIV NO PRSV INCREASED AG IM: CPT | Performed by: NURSE PRACTITIONER

## 2020-09-09 RX ORDER — AMLODIPINE BESYLATE 5 MG/1
5 TABLET ORAL DAILY
Qty: 90 TABLET | Refills: 3 | Status: SHIPPED | OUTPATIENT
Start: 2020-09-09 | End: 2021-09-22

## 2020-09-09 RX ORDER — HYDROCHLOROTHIAZIDE 12.5 MG/1
12.5 TABLET ORAL DAILY
Qty: 90 TABLET | Refills: 3 | Status: SHIPPED | OUTPATIENT
Start: 2020-09-09 | End: 2021-09-22

## 2020-09-09 ASSESSMENT — MIFFLIN-ST. JEOR: SCORE: 848.24

## 2020-09-09 NOTE — LETTER
September 10, 2020      Marisol Steele  [address]  [place] [address]        Dear ,    We are writing to inform you of your test results.    Your results are normal.         Resulted Orders   BASIC METABOLIC PANEL   Result Value Ref Range    Sodium 139 133 - 144 mmol/L    Potassium 3.4 3.4 - 5.3 mmol/L    Chloride 103 94 - 109 mmol/L    Carbon Dioxide 30 20 - 32 mmol/L    Anion Gap 6 3 - 14 mmol/L    Glucose 90 70 - 99 mg/dL    Urea Nitrogen 18 7 - 30 mg/dL    Creatinine 0.73 0.52 - 1.04 mg/dL    GFR Estimate 70 >60 mL/min/[1.73_m2]      Comment:      Non  GFR Calc  Starting 12/18/2018, serum creatinine based estimated GFR (eGFR) will be   calculated using the Chronic Kidney Disease Epidemiology Collaboration   (CKD-EPI) equation.      GFR Estimate If Black 81 >60 mL/min/[1.73_m2]      Comment:       GFR Calc  Starting 12/18/2018, serum creatinine based estimated GFR (eGFR) will be   calculated using the Chronic Kidney Disease Epidemiology Collaboration   (CKD-EPI) equation.      Calcium 9.2 8.5 - 10.1 mg/dL   Lipid panel reflex to direct LDL Fasting   Result Value Ref Range    Cholesterol 160 <200 mg/dL    Triglycerides 107 <150 mg/dL    HDL Cholesterol 69 >49 mg/dL    LDL Cholesterol Calculated 70 <100 mg/dL      Comment:      Desirable:       <100 mg/dl    Non HDL Cholesterol 91 <130 mg/dL       If you have any questions or concerns, please call the clinic at the number listed above.       Sincerely,        Rita Sloan, APRN CNP/[place]

## 2020-11-04 ENCOUNTER — VIRTUAL VISIT (OUTPATIENT)
Dept: FAMILY MEDICINE | Facility: CLINIC | Age: 85
End: 2020-11-04
Payer: COMMERCIAL

## 2020-11-04 ENCOUNTER — NURSE TRIAGE (OUTPATIENT)
Dept: NURSING | Facility: CLINIC | Age: 85
End: 2020-11-04

## 2020-11-04 DIAGNOSIS — R05.9 COUGH: Primary | ICD-10-CM

## 2020-11-04 DIAGNOSIS — R53.83 FATIGUE, UNSPECIFIED TYPE: ICD-10-CM

## 2020-11-04 PROCEDURE — 99213 OFFICE O/P EST LOW 20 MIN: CPT | Mod: 95 | Performed by: NURSE PRACTITIONER

## 2020-11-04 NOTE — TELEPHONE ENCOUNTER
"\"I need to be test because I'm not feeling well.\"  She said she needs to be tested for covid.    Current symptoms-present  one week.    Extreme fatigue  Generally achy        Reason for Disposition    [1] COVID-19 infection suspected by caller or triager AND [2] mild symptoms (cough, fever, or others) AND [3] no complications or SOB    Additional Information    Negative: SEVERE difficulty breathing (e.g., struggling for each breath, speaks in single words)    Negative: Difficult to awaken or acting confused (e.g., disoriented, slurred speech)    Negative: Bluish (or gray) lips or face now    Negative: Shock suspected (e.g., cold/pale/clammy skin, too weak to stand, low BP, rapid pulse)    Negative: Sounds like a life-threatening emergency to the triager    Negative: SEVERE or constant chest pain or pressure (Exception: mild central chest pain, present only when coughing)    Negative: MODERATE difficulty breathing (e.g., speaks in phrases, SOB even at rest, pulse 100-120)    Negative: Patient sounds very sick or weak to the triager    Negative: MILD difficulty breathing (e.g., minimal/no SOB at rest, SOB with walking, pulse <100)    Negative: Chest pain or pressure    Negative: Fever > 103 F (39.4 C)    Negative: [1] Fever > 101 F (38.3 C) AND [2] age > 60    Negative: [1] Fever > 100.0 F (37.8 C) AND [2] bedridden (e.g., nursing home patient, CVA, chronic illness, recovering from surgery)    Negative: HIGH RISK patient (e.g., age > 64 years, diabetes, heart or lung disease, weak immune system) (Exception: Has already been evaluated by healthcare provider and has no new or worsening symptoms)    Negative: Fever present > 3 days (72 hours)    Negative: [1] Fever returns after gone for over 24 hours AND [2] symptoms worse or not improved    Negative: [1] Continuous (nonstop) coughing interferes with work or school AND [2] no improvement using cough treatment per protocol    Protocols used: CORONAVIRUS (COVID-19) " DIAGNOSED OR [id number] 8.4.20    Vera HARRY, RN [facility]

## 2020-11-04 NOTE — PROGRESS NOTES
"Marisol Steele is a 89 year old female who is being evaluated via a billable telephone visit.      The patient has been notified of following:     \"This telephone visit will be conducted via a call between you and your physician/provider. We have found that certain health care needs can be provided without the need for a physical exam.  This service lets us provide the care you need with a short phone conversation.  If a prescription is necessary we can send it directly to your pharmacy.  If lab work is needed we can place an order for that and you can then stop by our lab to have the test done at a later time.    Telephone visits are billed at different rates depending on your insurance coverage. During this emergency period, for some insurers they may be billed the same as an in-person visit.  Please reach out to your insurance provider with any questions.    If during the course of the call the physician/provider feels a telephone visit is not appropriate, you will not be charged for this service.\"    Patient has given verbal consent for Telephone visit?  Yes    What phone number would you like to be contacted at? [phone]    How would you like to obtain your AVS? Mail a copy    Subjective     Marisol Steele is a 89 year old female who presents via phone visit today for the following health issues:    HPI       Concern for COVID-19    About how many days ago did these symptoms start? 1 week  Is this your first visit for this illness? Yes  In the 14 days before your symptoms started, have you had close contact with someone with COVID-19 (Coronavirus)? I do not know.  Do you have a fever or chills? Yes, I felt feverish or had chills  Are you having new or worsening difficulty breathing? No  Do you have new or worsening cough? Yes, it's a dry cough.   Have you had any new or unexplained body aches? No    Have you experienced any of the following NEW symptoms?    Headache: No    Sore throat: No    Loss of taste " or smell: No    Chest pain: No    Diarrhea: No    Rash: No  What treatments have you tried? nothing  Who do you live with? Son and grandson  Are you, or a household member, a healthcare worker or a ? No  Do you live in a nursing home, group home, or shelter? No  Do you have a way to get food/medications if quarantined? Yes, I have a friend or family member who can help me.    Rhinorrhea, dry cough started 1 week ago. Felt a little better, but now worse with aches and pains. Very tired and exhausted. Has to force self to eat, drinking tea and fluids.       Review of Systems   Constitutional, HEENT, cardiovascular, pulmonary, gi and gu systems are negative, except as otherwise noted.       Objective          Vitals:  No vitals were obtained today due to virtual visit.    healthy, alert and no distress  PSYCH: Alert and oriented times 3; coherent speech, normal   rate and volume, able to articulate logical thoughts, able   to abstract reason, no tangential thoughts, no hallucinations   or delusions  Her affect is normal  RESP: No cough, no audible wheezing, able to talk in full sentences  Remainder of exam unable to be completed due to telephone visits    No results found for any visits on 11/04/20.        Assessment/Plan:    Assessment & Plan     Cough  Recommend COVID-19 testing and home quarantine until results available. Push fluids and rest. Reviewed warning signs and when to follow up or seek care in Emergency Room.   - Symptomatic COVID-19 Virus (Coronavirus) by PCR; Future    Fatigue, unspecified type  As above  - Symptomatic COVID-19 Virus (Coronavirus) by PCR; Future        See Patient Instructions    No follow-ups on file.    JASPER Fagan [facility]    Phone call duration:  8 minutes

## 2020-11-06 ENCOUNTER — AMBULATORY - HEALTHEAST (OUTPATIENT)
Dept: FAMILY MEDICINE | Facility: CLINIC | Age: 85
End: 2020-11-06

## 2020-11-06 DIAGNOSIS — R53.83 FATIGUE, UNSPECIFIED TYPE: ICD-10-CM

## 2020-11-06 DIAGNOSIS — R05.9 COUGH: ICD-10-CM

## 2020-11-09 DIAGNOSIS — R53.83 FATIGUE, UNSPECIFIED TYPE: ICD-10-CM

## 2020-11-09 DIAGNOSIS — R05.9 COUGH: ICD-10-CM

## 2020-11-09 PROCEDURE — U0003 INFECTIOUS AGENT DETECTION BY NUCLEIC ACID (DNA OR RNA); SEVERE ACUTE RESPIRATORY SYNDROME CORONAVIRUS 2 (SARS-COV-2) (CORONAVIRUS DISEASE [COVID-19]), AMPLIFIED PROBE TECHNIQUE, MAKING USE OF HIGH THROUGHPUT TECHNOLOGIES AS DESCRIBED BY CMS-2020-01-R: HCPCS | Performed by: NURSE PRACTITIONER

## 2020-11-11 LAB
SARS-COV-2 RNA SPEC QL NAA+PROBE: ABNORMAL
SPECIMEN SOURCE: ABNORMAL

## 2020-11-12 ENCOUNTER — TELEPHONE (OUTPATIENT)
Dept: EMERGENCY MEDICINE | Facility: CLINIC | Age: 85
End: 2020-11-12

## 2020-11-12 NOTE — TELEPHONE ENCOUNTER
"Coronavirus (COVID-19) Notification    Caller Name (Patient, parent, daughter/son, grandparent, etc)  Patient    Reason for call  Notify of Positive Coronavirus (COVID-19) lab results, assess symptoms,  review [facility] recommendations    Lab Result    Lab test:  2019-nCoV rRt-PCR or SARS-CoV-2 PCR    Oropharyngeal AND/OR nasopharyngeal swabs is POSITIVE for 2019-nCoV RNA/SARS-COV-2 PCR (COVID-19 virus)    RN Recommendations/Instructions per [facility] Coronavirus COVID-19 recommendations    Brief introduction script  Introduce self then review script:  \"I am calling on behalf of [facility].  We were notified that your Coronavirus test (COVID-19) for was POSITIVE for the virus.  I have some information to relay to you but first I wanted to mention that the [place] Dept of Health will be contacting you shortly [it's possible MD already called Patient] to talk to you more about how you are feeling and other people you have had contact with who might now also have the virus.  Also,  [facility] is Partnering with the [facility] for Covid-19 research, you may be contacted directly by research staff.\"    Assessment (Inquire about Patient's current symptoms)   Assessment   Current Symptoms at time of phone call: (if no symptoms, document No symptoms] Feels better, \"has a feeling of well being\", no chills or fever, has only a slight cough   Symptoms onset (if applicable) 2 weeks or more     If at time of call, Patients symptoms hare worsened, the Patient should contact 911 or have someone drive them to Emergency Dept promptly:      If Patient calling 911, inform 911 personal that you have tested positive for the Coronavirus (COVID-19).  Place mask on and await 911 to arrive.    If Emergency Dept, If possible, please have another adult drive you to the Emergency Dept but you need to wear mask when in contact with other people.      Review information with Patient    Your result was positive. This " means you have COVID-19 (coronavirus).  We have sent you a letter that reviews the information that I'll be reviewing with you now.    How can I protect others?    If you have symptoms: stay home and away from others (self-isolate) until:    You've had no fever--and no medicine that reduces fever--for 1 full day (24 hours). And       Your other symptoms have gotten better. For example, your cough or breathing has improved. And     At least 10 days have passed since your symptoms started. (If you've been told by a doctor that you have a weak immune system, wait 20 days.)     If you don't have symptoms: Stay home and away from others (self-isolate) until at least 10 days have passed since your first positive COVID-19 test. (Date test collected)    During this time:    Stay in your own room, including for meals. Use your own bathroom if you can.    Stay away from others in your home. No hugging, kissing or shaking hands. No visitors.     Don't go to work, school or anywhere else.     Clean  high touch  surfaces often (doorknobs, counters, handles, etc.). Use a household cleaning spray or wipes. You'll find a full list on the EPA website at www.epa.gov/pesticide-registration/list-n-disinfectants-use-against-sars-cov-2.     Cover your mouth and nose with a mask, tissue or other face covering to avoid spreading germs.    Wash your hands and face often with soap and water.    Caregivers in these groups are at risk for severe illness due to COVID-19:  o People 65 years and older  o People who live in a nursing home or long-term care facility  o People with chronic disease (lung, heart, cancer, diabetes, kidney, liver, immunologic)  o People who have a weakened immune system, including those who:  - Are in cancer treatment  - Take medicine that weakens the immune system, such as corticosteroids  - Had a bone marrow or organ transplant  - Have an immune deficiency  - Have poorly controlled HIV or AIDS  - Are obese (body mass  index of 40 or higher)  - Smoke regularly    Caregivers should wear gloves while washing dishes, handling laundry and cleaning bedrooms and bathrooms.    Wash and dry laundry with special caution. Don't shake dirty laundry, and use the warmest water setting you can.    If you have a weakened immune system, ask your doctor about other actions you should take.    For more tips, go to www.cdc.gov/coronavirus/2019-ncov/downloads/10Things.pdf.    You should not go back to work until you meet the guidelines above for ending your home isolation. You don't need to be retested for COVID-19 before going back to work--studies show that you won't spread the virus if it's been at least 10 days since your symptoms started (or 20 days, if you have a weak immune system).    Employers: This document serves as formal notice of your employee's medical guidelines for going back to work. They must meet the above guidelines before going back to work in person.    How can I take care of myself?    1. Get lots of rest. Drink extra fluids (unless a doctor has told you not to).    2. Take Tylenol (acetaminophen) for fever or pain. If you have liver or kidney problems, ask your family doctor if it's okay to take Tylenol.     Take either:     650 mg (two 325 mg pills) every 4 to 6 hours, or     1,000 mg (two 500 mg pills) every 8 hours as needed.     Note: Don't take more than 3,000 mg in one day. Acetaminophen is found in many medicines (both prescribed and over-the-counter medicines). Read all labels to be sure you don't take too much.    For children, check the Tylenol bottle for the right dose (based on their age or weight).    3. If you have other health problems (like cancer, heart failure, an organ transplant or severe kidney disease): Call your specialty clinic if you don't feel better in the next 2 days.    4. Know when to call 911: Emergency warning signs include:    Trouble breathing or shortness of breath    Pain or pressure in the  chest that doesn't go away    Feeling confused like you haven't felt before, or not being able to wake up    Bluish-colored lips or face    5. Sign up for [employer]. We know it's scary to hear that you have COVID-19. We want to track your symptoms to make sure you're okay over the next 2 weeks. Please look for an email from [employer]--this is a free, online program that we'll use to keep in touch. To sign up, follow the link in the email. Learn more at [email or web address].    Where can I get more information?    [facility]: [email or web address]    Coronavirus Basics: www.health.[facility].mn./diseases/coronavirus/basics.html    What to Do If You're Sick: www.cdc.gov/coronavirus/2019-ncov/about/steps-when-sick.html    Ending Home Isolation: www.cdc.gov/coronavirus/2019-ncov/hcp/disposition-in-home-patients.html     Caring for Someone with COVID-19: www.cdc.gov/coronavirus/2019-ncov/if-you-are-sick/care-for-someone.html     [facility] clinical trials (COVID-19 research studies): clinicalaffairs.[facility]-clinical-trials     A Positive COVID-19 letter will be sent via [facility] or the mail. (Exception, no letters sent to Presurgerical/Preprocedure Patients)    [Name]  Elizabeth Chowdary RN  [place] [facility] [facility]

## 2020-11-12 NOTE — TELEPHONE ENCOUNTER
Coronavirus (COVID-19) Notification    Reason for call  Notify of POSITIVE  COVID-19 lab result, assess symptoms,  review [facility] recommendations    Lab Result   Lab test for 2019-nCoV rRt-PCR or SARS-COV-2 PCR  Oropharyngeal AND/OR nasopharyngeal swabs were POSITIVE for 2019-nCoV RNA [OR] SARS-COV-2 RNA (COVID-19) RNA     We have been unable to reach Patient by phone at this time to notify of their Positive COVID-19 result.  Left voicemail message requesting a call back to [phone] [facility] for results.        POSITIVE COVID-19 Letter sent.    [Name]  Tommie Prescott RN  [facility]  COVID19 Results Team RN  Ph# [phone]

## 2021-04-08 ENCOUNTER — IMMUNIZATION (OUTPATIENT)
Dept: NURSING | Facility: CLINIC | Age: 86
End: 2021-04-08
Payer: COMMERCIAL

## 2021-04-08 PROCEDURE — 0001A PR COVID VAC PFIZER DIL RECON 30 MCG/0.3 ML IM: CPT

## 2021-04-08 PROCEDURE — 91300 PR COVID VAC PFIZER DIL RECON 30 MCG/0.3 ML IM: CPT

## 2021-04-29 ENCOUNTER — IMMUNIZATION (OUTPATIENT)
Dept: NURSING | Facility: CLINIC | Age: 86
End: 2021-04-29
Attending: FAMILY MEDICINE
Payer: COMMERCIAL

## 2021-04-29 PROCEDURE — 91300 PR COVID VAC PFIZER DIL RECON 30 MCG/0.3 ML IM: CPT

## 2021-04-29 PROCEDURE — 0002A PR COVID VAC PFIZER DIL RECON 30 MCG/0.3 ML IM: CPT

## 2021-09-22 ENCOUNTER — OFFICE VISIT (OUTPATIENT)
Dept: FAMILY MEDICINE | Facility: CLINIC | Age: 86
End: 2021-09-22
Payer: COMMERCIAL

## 2021-09-22 VITALS
SYSTOLIC BLOOD PRESSURE: 138 MMHG | HEART RATE: 95 BPM | TEMPERATURE: 97.4 F | BODY MASS INDEX: 21.99 KG/M2 | OXYGEN SATURATION: 95 % | HEIGHT: 60 IN | DIASTOLIC BLOOD PRESSURE: 80 MMHG | WEIGHT: 112 LBS

## 2021-09-22 DIAGNOSIS — I10 HYPERTENSION, GOAL BELOW 150/90: ICD-10-CM

## 2021-09-22 DIAGNOSIS — E78.5 HYPERLIPIDEMIA LDL GOAL <130: ICD-10-CM

## 2021-09-22 DIAGNOSIS — Z00.00 ENCOUNTER FOR MEDICARE ANNUAL WELLNESS EXAM: Primary | ICD-10-CM

## 2021-09-22 LAB
ALBUMIN SERPL-MCNC: 3.6 G/DL (ref 3.4–5)
ALP SERPL-CCNC: 53 U/L (ref 40–150)
ALT SERPL W P-5'-P-CCNC: 14 U/L (ref 0–50)
ANION GAP SERPL CALCULATED.3IONS-SCNC: 6 MMOL/L (ref 3–14)
AST SERPL W P-5'-P-CCNC: 20 U/L (ref 0–45)
BASOPHILS # BLD AUTO: 0 10E3/UL (ref 0–0.2)
BASOPHILS NFR BLD AUTO: 0 %
BILIRUB SERPL-MCNC: 0.6 MG/DL (ref 0.2–1.3)
BUN SERPL-MCNC: 17 MG/DL (ref 7–30)
CALCIUM SERPL-MCNC: 9 MG/DL (ref 8.5–10.1)
CHLORIDE BLD-SCNC: 105 MMOL/L (ref 94–109)
CHOLEST SERPL-MCNC: 225 MG/DL
CO2 SERPL-SCNC: 31 MMOL/L (ref 20–32)
CREAT SERPL-MCNC: 0.79 MG/DL (ref 0.52–1.04)
EOSINOPHIL # BLD AUTO: 0.1 10E3/UL (ref 0–0.7)
EOSINOPHIL NFR BLD AUTO: 2 %
ERYTHROCYTE [DISTWIDTH] IN BLOOD BY AUTOMATED COUNT: 14.8 % (ref 10–15)
FASTING STATUS PATIENT QL REPORTED: YES
GFR SERPL CREATININE-BSD FRML MDRD: 64 ML/MIN/1.73M2
GLUCOSE BLD-MCNC: 94 MG/DL (ref 70–99)
HCT VFR BLD AUTO: 44.8 % (ref 35–47)
HDLC SERPL-MCNC: 67 MG/DL
HGB BLD-MCNC: 15.1 G/DL (ref 11.7–15.7)
LDLC SERPL CALC-MCNC: 137 MG/DL
LYMPHOCYTES # BLD AUTO: 2.3 10E3/UL (ref 0.8–5.3)
LYMPHOCYTES NFR BLD AUTO: 32 %
MCH RBC QN AUTO: 31.1 PG (ref 26.5–33)
MCHC RBC AUTO-ENTMCNC: 33.7 G/DL (ref 31.5–36.5)
MCV RBC AUTO: 92 FL (ref 78–100)
MONOCYTES # BLD AUTO: 0.7 10E3/UL (ref 0–1.3)
MONOCYTES NFR BLD AUTO: 10 %
NEUTROPHILS # BLD AUTO: 4 10E3/UL (ref 1.6–8.3)
NEUTROPHILS NFR BLD AUTO: 56 %
NONHDLC SERPL-MCNC: 158 MG/DL
PLATELET # BLD AUTO: 219 10E3/UL (ref 150–450)
POTASSIUM BLD-SCNC: 3.5 MMOL/L (ref 3.4–5.3)
PROT SERPL-MCNC: 7.3 G/DL (ref 6.8–8.8)
RBC # BLD AUTO: 4.85 10E6/UL (ref 3.8–5.2)
SODIUM SERPL-SCNC: 142 MMOL/L (ref 133–144)
TRIGL SERPL-MCNC: 104 MG/DL
WBC # BLD AUTO: 7.2 10E3/UL (ref 4–11)

## 2021-09-22 PROCEDURE — 85025 COMPLETE CBC W/AUTO DIFF WBC: CPT | Performed by: NURSE PRACTITIONER

## 2021-09-22 PROCEDURE — 80061 LIPID PANEL: CPT | Performed by: NURSE PRACTITIONER

## 2021-09-22 PROCEDURE — 36415 COLL VENOUS BLD VENIPUNCTURE: CPT | Performed by: NURSE PRACTITIONER

## 2021-09-22 PROCEDURE — 99397 PER PM REEVAL EST PAT 65+ YR: CPT | Mod: 25 | Performed by: NURSE PRACTITIONER

## 2021-09-22 PROCEDURE — G0008 ADMIN INFLUENZA VIRUS VAC: HCPCS | Performed by: NURSE PRACTITIONER

## 2021-09-22 PROCEDURE — 80053 COMPREHEN METABOLIC PANEL: CPT | Performed by: NURSE PRACTITIONER

## 2021-09-22 PROCEDURE — 90662 IIV NO PRSV INCREASED AG IM: CPT | Performed by: NURSE PRACTITIONER

## 2021-09-22 RX ORDER — HYDROCHLOROTHIAZIDE 12.5 MG/1
12.5 TABLET ORAL DAILY
Qty: 90 TABLET | Refills: 3 | Status: SHIPPED | OUTPATIENT
Start: 2021-09-22 | End: 2022-09-14

## 2021-09-22 RX ORDER — AMLODIPINE BESYLATE 5 MG/1
5 TABLET ORAL DAILY
Qty: 90 TABLET | Refills: 3 | Status: SHIPPED | OUTPATIENT
Start: 2021-09-22 | End: 2022-09-14

## 2021-09-22 ASSESSMENT — MIFFLIN-ST. JEOR: SCORE: 824.53

## 2021-09-22 ASSESSMENT — ACTIVITIES OF DAILY LIVING (ADL): CURRENT_FUNCTION: NO ASSISTANCE NEEDED

## 2021-09-22 NOTE — RESULT ENCOUNTER NOTE
Please mail letter:    Your results are normal. Cholesterol is higher due to stopping medication, but not too high.    Rita Sloan, CNP

## 2021-09-22 NOTE — LETTER
September 23, 2021      Marisol Steele  [address] [place] [address]        Dear ,    We are writing to inform you of your test results.    Your results are normal. Cholesterol is higher due to stopping medication, but not too high.          Resulted Orders   Lipid panel reflex to direct LDL Fasting   Result Value Ref Range    Cholesterol 225 (H) <200 mg/dL    Triglycerides 104 <150 mg/dL    Direct Measure HDL 67 >=50 mg/dL    LDL Cholesterol Calculated 137 (H) <=100 mg/dL    Non HDL Cholesterol 158 (H) <130 mg/dL    Patient Fasting > 8hrs? Yes     Narrative    Cholesterol  Desirable:  <200 mg/dL    Triglycerides  Normal:  Less than 150 mg/dL  Borderline High:  150-199 mg/dL  High:  200-499 mg/dL  Very High:  Greater than or equal to 500 mg/dL    Direct Measure HDL  Female:  Greater than or equal to 50 mg/dL   Male:  Greater than or equal to 40 mg/dL    LDL Cholesterol  Desirable:  <100mg/dL  Above Desirable:  100-129 mg/dL   Borderline High:  130-159 mg/dL   High:  160-189 mg/dL   Very High:  >= 190 mg/dL    Non HDL Cholesterol  Desirable:  130 mg/dL  Above Desirable:  130-159 mg/dL  Borderline High:  160-189 mg/dL  High:  190-219 mg/dL  Very High:  Greater than or equal to 220 mg/dL   Comprehensive metabolic panel (BMP + Alb, Alk Phos, ALT, AST, Total. Bili, TP)   Result Value Ref Range    Sodium 142 133 - 144 mmol/L    Potassium 3.5 3.4 - 5.3 mmol/L    Chloride 105 94 - 109 mmol/L    Carbon Dioxide (CO2) 31 20 - 32 mmol/L    Anion Gap 6 3 - 14 mmol/L    Urea Nitrogen 17 7 - 30 mg/dL    Creatinine 0.79 0.52 - 1.04 mg/dL    Calcium 9.0 8.5 - 10.1 mg/dL    Glucose 94 70 - 99 mg/dL    Alkaline Phosphatase 53 40 - 150 U/L    AST 20 0 - 45 U/L    ALT 14 0 - 50 U/L    Protein Total 7.3 6.8 - 8.8 g/dL    Albumin 3.6 3.4 - 5.0 g/dL    Bilirubin Total 0.6 0.2 - 1.3 mg/dL    GFR Estimate 64 >60 mL/min/1.73m2      Comment:      As of July 11, 2021, eGFR is calculated by the CKD-EPI creatinine equation,  without race adjustment. eGFR can be influenced by muscle mass, exercise, and diet. The reported eGFR is an estimation only and is only applicable if the renal function is stable.   CBC with platelets and differential   Result Value Ref Range    WBC Count 7.2 4.0 - 11.0 10e3/uL    RBC Count 4.85 3.80 - 5.20 10e6/uL    Hemoglobin 15.1 11.7 - 15.7 g/dL    Hematocrit 44.8 35.0 - 47.0 %    MCV 92 78 - 100 fL    MCH 31.1 26.5 - 33.0 pg    MCHC 33.7 31.5 - 36.5 g/dL    RDW 14.8 10.0 - 15.0 %    Platelet Count 219 150 - 450 10e3/uL    % Neutrophils 56 %    % Lymphocytes 32 %    % Monocytes 10 %    % Eosinophils 2 %    % Basophils 0 %    Absolute Neutrophils 4.0 1.6 - 8.3 10e3/uL    Absolute Lymphocytes 2.3 0.8 - 5.3 10e3/uL    Absolute Monocytes 0.7 0.0 - 1.3 10e3/uL    Absolute Eosinophils 0.1 0.0 - 0.7 10e3/uL    Absolute Basophils 0.0 0.0 - 0.2 10e3/uL       If you have any questions or concerns, please call the clinic at the number listed above.       Sincerely,      JASPER Fagan CNP/[place]

## 2021-09-22 NOTE — PROGRESS NOTES
"SUBJECTIVE:   Marisol Steele is a 89 year old female who presents for Preventive Visit.    Patient has been advised of split billing requirements and indicates understanding: Yes     Are you in the first 12 months of your Medicare coverage?  No    Healthy Habits:     In general, how would you rate your overall health?  Good    Frequency of exercise:  None    Duration of exercise:  Less than 15 minutes    Do you usually eat at least 4 servings of fruit and vegetables a day, include whole grains    & fiber and avoid regularly eating high fat or \"junk\" foods?  Yes    Taking medications regularly:  Yes    Barriers to taking medications:  Not applicable    Medication side effects:  None    Ability to successfully perform activities of daily living:  No assistance needed    Home Safety:  No safety concerns identified    Hearing Impairment:  Difficulty following a conversation in a noisy restaurant or crowded room    In the past 6 months, have you been bothered by leaking of urine? Yes    In general, how would you rate your overall mental or emotional health?  Very good      PHQ-2 Total Score: 0    Additional concerns today:  No    Do you feel safe in your environment? Yes    Have you ever done Advance Care Planning? (For example, a Health Directive, POLST, or a discussion with a medical provider or your loved ones about your wishes): Yes, advance care planning is on file.     Fall risk  Fallen 2 or more times in the past year?: No  Any fall with injury in the past year?: No    Cognitive Screening   1) Repeat 3 items (Leader, Season, Table)    2) Clock draw: NORMAL  3) 3 item recall: Recalls 3 objects  Results: 3 items recalled: COGNITIVE IMPAIRMENT LESS LIKELY    Mini-CogTM Copyright TRACI Cabrera. Licensed by the author for use in [facility]; reprinted with permission ([email or web address]@.[email or web address]). All rights reserved.      Do you have sleep apnea, excessive snoring or daytime drowsiness?: no    Reviewed and updated as needed " this visit by clinical staff                 Reviewed and updated as needed this visit by Provider                Social History     Tobacco Use     Smoking status: Former Smoker     Types: Cigarettes     Quit date: 1990     Years since quittin.2     Smokeless tobacco: Never Used   Substance Use Topics     Alcohol use: Yes     Comment: occ wine     If you drink alcohol do you typically have >3 drinks per day or >7 drinks per week? No    Alcohol Use 2011   Prescreen: >3 drinks/day or >7 drinks/week? The patient does not drink >3 drinks per day nor >7 drinks per week.           Hypertension Follow-up      Do you check your blood pressure regularly outside of the clinic? No     Are you following a low salt diet? No    Are your blood pressures ever more than 140 on the top number (systolic) OR more   than 90 on the bottom number (diastolic), for example 140/90? No      Current providers sharing in care for this patient include:   Patient Care Team:  Rita Sloan APRN CNP as PCP - General (Nurse Practitioner)  Rita Sloan APRN CNP as Assigned PCP    The following health maintenance items are reviewed in Epic and correct as of today:  Health Maintenance Due   Topic Date Due     ANNUAL REVIEW OF HM ORDERS  Never done     DEXA  Never done     ZOSTER IMMUNIZATION (2 of 3) 2006     EYE EXAM  10/25/2019     PHQ-2  2021     INFLUENZA VACCINE (1) 2021     BMP  2021     FALL RISK ASSESSMENT  2021     MEDICARE ANNUAL WELLNESS VISIT  2021     Labs reviewed in EPIC      Mammogram Screening - Patient over age 75, has elected to discontinue screenings.  Pertinent mammograms are reviewed under the imaging tab.    Review of Systems  Constitutional, HEENT, cardiovascular, pulmonary, gi and gu systems are negative, except as otherwise noted.    OBJECTIVE:   There were no vitals taken for this visit. Estimated body mass index is 23.04 kg/m  as calculated from the  "following:    Height as of 9/9/20: 1.518 m (4' 11.75\").    Weight as of 9/9/20: 53.1 kg (117 lb).  Physical Exam  GENERAL: healthy, alert and no distress  EYES: Eyes grossly normal to inspection, PERRL and conjunctivae and sclerae normal  HENT: ear canals and TM's normal, nose and mouth without ulcers or lesions  NECK: no adenopathy, no asymmetry, masses, or scars and thyroid normal to palpation  RESP: lungs clear to auscultation - no rales, rhonchi or wheezes  CV: regular rate and rhythm, normal S1 S2, no S3 or S4, no murmur, click or rub, no peripheral edema and peripheral pulses strong  ABDOMEN: soft, nontender, no hepatosplenomegaly, no masses and bowel sounds normal  MS: no gross musculoskeletal defects noted, no edema  SKIN: no suspicious lesions or rashes  NEURO: Normal strength and tone, mentation intact and speech normal  PSYCH: mentation appears normal, affect normal/bright    Diagnostic Test Results:  Labs reviewed in Epic  Results for orders placed or performed in visit on 09/22/21   CBC with platelets and differential     Status: None   Result Value Ref Range    WBC Count 7.2 4.0 - 11.0 10e3/uL    RBC Count 4.85 3.80 - 5.20 10e6/uL    Hemoglobin 15.1 11.7 - 15.7 g/dL    Hematocrit 44.8 35.0 - 47.0 %    MCV 92 78 - 100 fL    MCH 31.1 26.5 - 33.0 pg    MCHC 33.7 31.5 - 36.5 g/dL    RDW 14.8 10.0 - 15.0 %    Platelet Count 219 150 - 450 10e3/uL    % Neutrophils 56 %    % Lymphocytes 32 %    % Monocytes 10 %    % Eosinophils 2 %    % Basophils 0 %    Absolute Neutrophils 4.0 1.6 - 8.3 10e3/uL    Absolute Lymphocytes 2.3 0.8 - 5.3 10e3/uL    Absolute Monocytes 0.7 0.0 - 1.3 10e3/uL    Absolute Eosinophils 0.1 0.0 - 0.7 10e3/uL    Absolute Basophils 0.0 0.0 - 0.2 10e3/uL   CBC with platelets and differential     Status: None    Narrative    The following orders were created for panel order CBC with platelets and differential.  Procedure                               Abnormality         Status                  " "   ---------                               -----------         ------                     CBC with platelets and d...[[id number]]                      Final result                 Please view results for these tests on the individual orders.       ASSESSMENT / PLAN:   (Z00.00) Encounter for Medicare annual wellness exam  (primary encounter diagnosis)  Comment: Doing well, very healthy and performing most of her own housework. Patient declines DEXA scan- reviewed risks of Osteoporosis.  Plan: Lipid panel reflex to direct LDL Fasting,         Comprehensive metabolic panel (BMP + Alb, Alk         Phos, ALT, AST, Total. Bili, TP), CBC with         platelets and differential            (I10) Hypertension, goal below 150/90  Comment: Well controlled, continue current medications.  Plan: amLODIPine (NORVASC) 5 MG tablet,         hydrochlorothiazide (HYDRODIURIL) 12.5 MG         tablet, Comprehensive metabolic panel (BMP +         Alb, Alk Phos, ALT, AST, Total. Bili, TP), CBC         with platelets and differential            (E78.5) Hyperlipidemia LDL goal <130  Comment: Stopped statin last year, patient would like this checked.  Plan: Lipid panel reflex to direct LDL Fasting              Patient has been advised of split billing requirements and indicates understanding: No  COUNSELING:  Reviewed preventive health counseling, as reflected in patient instructions       Regular exercise       Healthy diet/nutrition       Hearing screening       Bladder control       Immunizations    Declined: Zoster due to Other doesn't feel she needs this               Osteoporosis prevention/bone health    Estimated body mass index is 23.04 kg/m  as calculated from the following:    Height as of 9/9/20: 1.518 m (4' 11.75\").    Weight as of 9/9/20: 53.1 kg (117 lb).    Weight management plan noted, stable and monitoring    She reports that she quit smoking about 31 years ago. Her smoking use included cigarettes. She has never used smokeless " tobacco.      Appropriate preventive services were discussed with this patient, including applicable screening as appropriate for cardiovascular disease, diabetes, osteopenia/osteoporosis, and glaucoma.  As appropriate for age/gender, discussed screening for colorectal cancer, prostate cancer, breast cancer, and cervical cancer. Checklist reviewing preventive services available has been given to the patient.    Reviewed patients plan of care and provided an AVS. The Intermediate Care Plan ( asthma action plan, low back pain action plan, and migraine action plan) for Marisol meets the Care Plan requirement. This Care Plan has been established and reviewed with the Patient.    Counseling Resources:  ATP IV Guidelines  Pooled Cohorts Equation Calculator  Breast Cancer Risk Calculator  Breast Cancer: Medication to Reduce Risk  FRAX Risk Assessment  ICSI Preventive Guidelines  Dietary Guidelines for Americans, 2010  USDA's MyPlate  ASA Prophylaxis  Lung CA Screening    JASPER Fagan [facility]    Identified Health Risks:

## 2021-09-22 NOTE — PATIENT INSTRUCTIONS
Patient Education   Personalized Prevention Plan  You are due for the preventive services outlined below.  Your care team is available to assist you in scheduling these services.  If you have already completed any of these items, please share that information with your care team to update in your medical record.  Health Maintenance Due   Topic Date Due     ANNUAL REVIEW OF HM ORDERS  Never done     Osteoporosis Screening  Never done     Zoster (Shingles) Vaccine (2 of 3) 12/27/2006     Eye Exam  10/25/2019     PHQ-2  01/01/2021     Flu Vaccine (1) 09/01/2021     Basic Metabolic Panel  09/09/2021     FALL RISK ASSESSMENT  09/09/2021     Annual Wellness Visit  09/09/2021

## 2022-09-13 DIAGNOSIS — I10 HYPERTENSION, GOAL BELOW 150/90: ICD-10-CM

## 2022-09-14 RX ORDER — AMLODIPINE BESYLATE 5 MG/1
TABLET ORAL
Qty: 90 TABLET | Refills: 0 | Status: SHIPPED | OUTPATIENT
Start: 2022-09-14 | End: 2022-12-20

## 2022-09-14 RX ORDER — HYDROCHLOROTHIAZIDE 12.5 MG/1
12.5 TABLET ORAL DAILY
Qty: 90 TABLET | Refills: 0 | Status: SHIPPED | OUTPATIENT
Start: 2022-09-14 | End: 2022-12-20

## 2022-10-06 ENCOUNTER — TELEPHONE (OUTPATIENT)
Dept: FAMILY MEDICINE | Facility: CLINIC | Age: 87
End: 2022-10-06

## 2022-10-06 ENCOUNTER — OFFICE VISIT (OUTPATIENT)
Dept: FAMILY MEDICINE | Facility: CLINIC | Age: 87
End: 2022-10-06
Payer: COMMERCIAL

## 2022-10-06 VITALS
OXYGEN SATURATION: 91 % | TEMPERATURE: 98 F | HEIGHT: 60 IN | HEART RATE: 86 BPM | BODY MASS INDEX: 22.48 KG/M2 | WEIGHT: 114.5 LBS | RESPIRATION RATE: 14 BRPM | DIASTOLIC BLOOD PRESSURE: 78 MMHG | SYSTOLIC BLOOD PRESSURE: 122 MMHG

## 2022-10-06 DIAGNOSIS — Z00.00 ENCOUNTER FOR MEDICARE ANNUAL WELLNESS EXAM: Primary | ICD-10-CM

## 2022-10-06 LAB
ANION GAP SERPL CALCULATED.3IONS-SCNC: 2 MMOL/L (ref 3–14)
BASOPHILS # BLD AUTO: 0 10E3/UL (ref 0–0.2)
BASOPHILS NFR BLD AUTO: 1 %
BUN SERPL-MCNC: 16 MG/DL (ref 7–30)
CALCIUM SERPL-MCNC: 9.2 MG/DL (ref 8.5–10.1)
CHLORIDE BLD-SCNC: 107 MMOL/L (ref 94–109)
CO2 SERPL-SCNC: 31 MMOL/L (ref 20–32)
CREAT SERPL-MCNC: 0.74 MG/DL (ref 0.52–1.04)
EOSINOPHIL # BLD AUTO: 0.1 10E3/UL (ref 0–0.7)
EOSINOPHIL NFR BLD AUTO: 1 %
ERYTHROCYTE [DISTWIDTH] IN BLOOD BY AUTOMATED COUNT: 14.9 % (ref 10–15)
GFR SERPL CREATININE-BSD FRML MDRD: 74 ML/MIN/1.73M2
GLUCOSE BLD-MCNC: 98 MG/DL (ref 70–99)
HCT VFR BLD AUTO: 47.3 % (ref 35–47)
HGB BLD-MCNC: 15.8 G/DL (ref 11.7–15.7)
LYMPHOCYTES # BLD AUTO: 2.1 10E3/UL (ref 0.8–5.3)
LYMPHOCYTES NFR BLD AUTO: 28 %
MCH RBC QN AUTO: 31.3 PG (ref 26.5–33)
MCHC RBC AUTO-ENTMCNC: 33.4 G/DL (ref 31.5–36.5)
MCV RBC AUTO: 94 FL (ref 78–100)
MONOCYTES # BLD AUTO: 0.8 10E3/UL (ref 0–1.3)
MONOCYTES NFR BLD AUTO: 10 %
NEUTROPHILS # BLD AUTO: 4.6 10E3/UL (ref 1.6–8.3)
NEUTROPHILS NFR BLD AUTO: 60 %
PLATELET # BLD AUTO: 243 10E3/UL (ref 150–450)
POTASSIUM BLD-SCNC: 3.5 MMOL/L (ref 3.4–5.3)
RBC # BLD AUTO: 5.05 10E6/UL (ref 3.8–5.2)
SODIUM SERPL-SCNC: 140 MMOL/L (ref 133–144)
WBC # BLD AUTO: 7.6 10E3/UL (ref 4–11)

## 2022-10-06 PROCEDURE — 90471 IMMUNIZATION ADMIN: CPT | Performed by: STUDENT IN AN ORGANIZED HEALTH CARE EDUCATION/TRAINING PROGRAM

## 2022-10-06 PROCEDURE — 80048 BASIC METABOLIC PNL TOTAL CA: CPT | Performed by: STUDENT IN AN ORGANIZED HEALTH CARE EDUCATION/TRAINING PROGRAM

## 2022-10-06 PROCEDURE — 0124A COVID-19,PF,PFIZER BOOSTER BIVALENT: CPT | Performed by: STUDENT IN AN ORGANIZED HEALTH CARE EDUCATION/TRAINING PROGRAM

## 2022-10-06 PROCEDURE — 36415 COLL VENOUS BLD VENIPUNCTURE: CPT | Performed by: STUDENT IN AN ORGANIZED HEALTH CARE EDUCATION/TRAINING PROGRAM

## 2022-10-06 PROCEDURE — 85025 COMPLETE CBC W/AUTO DIFF WBC: CPT | Performed by: STUDENT IN AN ORGANIZED HEALTH CARE EDUCATION/TRAINING PROGRAM

## 2022-10-06 PROCEDURE — 90662 IIV NO PRSV INCREASED AG IM: CPT | Performed by: STUDENT IN AN ORGANIZED HEALTH CARE EDUCATION/TRAINING PROGRAM

## 2022-10-06 PROCEDURE — G0439 PPPS, SUBSEQ VISIT: HCPCS | Performed by: STUDENT IN AN ORGANIZED HEALTH CARE EDUCATION/TRAINING PROGRAM

## 2022-10-06 PROCEDURE — 91312 COVID-19,PF,PFIZER BOOSTER BIVALENT: CPT | Performed by: STUDENT IN AN ORGANIZED HEALTH CARE EDUCATION/TRAINING PROGRAM

## 2022-10-06 ASSESSMENT — ACTIVITIES OF DAILY LIVING (ADL): CURRENT_FUNCTION: NO ASSISTANCE NEEDED

## 2022-10-06 NOTE — TELEPHONE ENCOUNTER
Left a voice message for patient instructing patient to call clinic to discuss lab results.     Dr. Meghana Randall

## 2022-10-06 NOTE — PATIENT INSTRUCTIONS
You are amazing and I am so happy to have met you! Keep up the great work!    Thank you for choosing [facility] and also for choosing me as your Physician. :)    Have a wonderful day     -Dr. Meghana Randall     Patient Education   Personalized Prevention Plan  You are due for the preventive services outlined below.  Your care team is available to assist you in scheduling these services.  If you have already completed any of these items, please share that information with your care team to update in your medical record.  Health Maintenance Due   Topic Date Due    ANNUAL REVIEW OF HM ORDERS  Never done    Osteoporosis Screening  Never done    Zoster (Shingles) Vaccine (2 of 3) 12/27/2006    Eye Exam  10/25/2019    COVID-19 Vaccine (4 - Booster for Pfizer series) 01/19/2022    Flu Vaccine (1) 09/01/2022    Basic Metabolic Panel  09/22/2022       Understanding USDA MyPlate  The USDA has guidelines to help you make healthy food choices. These are called MyPlate. MyPlate shows the food groups that make up healthy meals using the image of a place setting. Before you eat, think about the healthiest choices for what to put on your plate or in your cup or bowl. To learn more about building a healthy plate, visit www.choosemyplate.gov.    The food groups  Fruits. Any fruit or 100% fruit juice counts as part of the Fruit Group. Fruits may be fresh, canned, frozen, or dried, and may be whole, cut-up, or pureed. Make 1/2 of your plate fruits and vegetables.  Vegetables. Any vegetable or 100% vegetable juice counts as a member of the Vegetable Group. Vegetables may be fresh, frozen, canned, or dried. They can be served raw or cooked and may be whole, cut-up, or mashed. Make 1/2 of your plate fruits and vegetables.  Grains. All foods made from grains are part of the Grains Group. These include wheat, rice, oats, cornmeal, and barley. Grains are often used to make foods such as bread, pasta, oatmeal, cereal, tortillas, and  grits. Grains should be no more than 1/4 of your plate. At least half of your grains should be whole grains.  Protein. This group includes meat, poultry, seafood, beans and peas, eggs, processed soy products (such as tofu), nuts (including nut butters), and seeds. Make protein choices no more than 1/4 of your plate. Meat and poultry choices should be lean or low fat.  Dairy. The Dairy Group includes all fluid milk products and foods made from milk that contain calcium, such as yogurt and cheese. (Foods that have little calcium, such as cream, butter, and cream cheese, are not part of this group.) Most dairy choices should be low-fat or fat-free.  Oils. Oils aren't a food group, but they do contain essential nutrients. However it's important to watch your intake of oils. These are fats that are liquid at room temperature. They include canola, corn, olive, soybean, vegetable, and sunflower oil. Foods that are mainly oil include mayonnaise, certain salad dressings, and soft margarines. You likely already get your daily oil allowance from the foods you eat.  Things to limit  Eating healthy also means limiting these things in your diet:     Salt (sodium). Many processed foods have a lot of sodium. To keep sodium intake down, eat fresh vegetables, meats, poultry, and seafood when possible. Purchase low-sodium, reduced-sodium, or no-salt-added food products at the store. And don't add salt to your meals at home. Instead, season them with herbs and spices such as dill, oregano, cumin, and paprika. Or try adding flavor with lemon or lime zest and juice.  Saturated fat. Saturated fats are most often found in animal products such as beef, pork, and chicken. They are often solid at room temperature, such as butter. To reduce your saturated fat intake, choose leaner cuts of meat and poultry. And try healthier cooking methods such as grilling, broiling, roasting, or baking. For a simple lower-fat swap, use plain nonfat yogurt  instead of mayonnaise when making potato salad or macaroni salad.  Added sugars. These are sugars added to foods. They are in foods such as ice cream, candy, soda, fruit drinks, sports drinks, energy drinks, cookies, pastries, jams, and syrups. Cut down on added sugars by sharing sweet treats with a family member or friend. You can also choose fruit for dessert, and drink water or other unsweetened beverages.     [employer] last reviewed this educational content on 6/1/2020 2000-2021 [employer]. All rights reserved. This information is not intended as a substitute for professional medical care. Always follow your healthcare professional's instructions.          Signs of Hearing Loss      Hearing much better with one ear can be a sign of hearing loss.   Hearing loss is a problem shared by many people. In fact, it is one of the most common health problems, particularly as people age. Most people age 65 and older have some hearing loss. By age 80, almost everyone does. Hearing loss often occurs slowly over the years. So you may not realize your hearing has gotten worse.  Have your hearing checked  Call your healthcare provider if you:  Have to strain to hear normal conversation  Have to watch other people s faces very carefully to follow what they re saying  Need to ask people to repeat what they ve said  Often misunderstand what people are saying  Turn the volume of the television or radio up so high that others complain  Feel that people are mumbling when they re talking to you  Find that the effort to hear leaves you feeling tired and irritated  Notice, when using the phone, that you hear better with one ear than the other  [employer] last reviewed this educational content on 1/1/2020 2000-2021 [employer]. All rights reserved. This information is not intended as a substitute for professional medical care. Always follow your healthcare professional's instructions.          Urinary  Incontinence, Female (Adult)   Urinary incontinence means loss of bladder control. This problem affects many women, especially as they get older. If you have incontinence, you may be embarrassed to ask for help. But know that this problem can be treated.   Types of Incontinence  There are different types of incontinence. Two of the main types are described here. You can have more than one type.   Stress incontinence. With this type, urine leaks when pressure (stress) is put on the bladder. This may happen when you cough, sneeze, or laugh. Stress incontinence most often occurs because the pelvic floor muscles that support the bladder and urethra are weak. This can happen after pregnancy and vaginal childbirth or a hysterectomy. It can also be due to excess body weight or hormone changes.  Urge incontinence (also called overactive bladder). With this type, a sudden urge to urinate is felt often. This may happen even though there may not be much urine in the bladder. The need to urinate often during the night is common. Urge incontinence most often occurs because of bladder spasms. This may be due to bladder irritation or infection. Damage to bladder nerves or pelvic muscles, constipation, and certain medicines can also lead to urge incontinence.  Treatment depends on the cause. Further evaluation is needed to find the type you have. This will likely include an exam and certain tests. Based on the results, you and your healthcare provider can then plan treatment. Until a diagnosis is made, the home care tips below can help ease symptoms.   Home care  Do pelvic floor muscle exercises, if they are prescribed. The pelvic floor muscles help support the bladder and urethra. Many women find that their symptoms improve when doing special exercises that strengthen these muscles. To do the exercises, contract the muscles you would use to stop your stream of urine. But do this when you re not urinating. Hold for 10 seconds, then  relax. Repeat 10 to 20 times in a row, at least 3 times a day. Your healthcare provider may give you other instructions for how to do the exercises and how often.  Keep a bladder diary. This helps track how often and how much you urinate over a set period of time. Bring this diary with you to your next visit with the provider. The information can help your provider learn more about your bladder problem.  Lose weight, if advised to by your provider. Extra weight puts pressure on the bladder. Your provider can help you create a weight-loss plan that s right for you. This may include exercising more and making certain diet changes.  Don't have foods and drinks that may irritate the bladder. These can include alcohol and caffeinated drinks.  Quit smoking. Smoking and other tobacco use can lead to a long-term (chronic) cough that strains the pelvic floor muscles. Smoking may also damage the bladder and urethra. Talk with your provider about treatments or methods you can use to quit smoking.  If drinking large amounts of fluid makes you have symptoms, you may be advised to limit your fluid intake. You may also be advised to drink most of your fluids during the day and to limit fluids at night.  If you re worried about urine leakage or accidents, you may wear absorbent pads to catch urine. Change the pads often. This helps reduce discomfort. It may also reduce the risk of skin or bladder infections.    Follow-up care  Follow up with your healthcare provider, or as directed. It may take some to find the right treatment for your problem. But healthy lifestyle changes can be made right away. These include such things as exercising on a regular basis, eating a healthy diet, losing weight (if needed), and quitting smoking. Your treatment plan may include special therapies or medicines. Certain procedures or surgery may also be options. Talk about any questions you have with your provider.   When to seek medical advice  Call the  healthcare provider right away if any of these occur:  Fever of 100.4 F (38 C) or higher, or as directed by your provider  Bladder pain or fullness  Belly swelling  Nausea or vomiting  Back pain  Weakness, dizziness, or fainting  [employer] last reviewed this educational content on 1/1/2020 2000-2021 [employer]. All rights reserved. This information is not intended as a substitute for professional medical care. Always follow your healthcare professional's instructions.

## 2022-10-06 NOTE — PROGRESS NOTES
"SUBJECTIVE:   Marisol is a 89 year old who presents for Preventive Visit.      Patient has been advised of split billing requirements and indicates understanding: Yes  Are you in the first 12 months of your Medicare coverage?  No    Healthy Habits:    In general, how would you rate your overall health?  Good    Frequency of exercise:  2-3 days/week    Duration of exercise:  15-30 minutes    Do you usually eat at least 4 servings of fruit and vegetables a day, include whole grains    & fiber and avoid regularly eating high fat or \"junk\" foods?  No    Taking medications regularly:  Yes    Barriers to taking medications:  None    Medication side effects:  None    Ability to successfully perform activities of daily living:  No assistance needed    Hearing Impairment:  Difficulty following a conversation in a noisy restaurant or crowded room    In the past 6 months, have you been bothered by leaking of urine? Yes    In general, how would you rate your overall mental or emotional health?  Good      PHQ-2 Total Score:    Additional concerns today:  No     Do you feel safe in your environment? Yes    Have you ever done Advance Care Planning? (For example, a Health Directive, POLST, or a discussion with a medical provider or your loved ones about your wishes): Yes, advance care planning is on file.       Fall risk  Fallen 2 or more times in the past year?: No  Any fall with injury in the past year?: No    Cognitive Screening   1) Repeat 3 items (Leader, Season, Table)    2) Clock draw: NORMAL  3) 3 item recall: Recalls 3 objects  Results: 3 items recalled: COGNITIVE IMPAIRMENT LESS LIKELY    Mini-CogTM Copyright TRACI Cabrera. Licensed by the author for use in [facility]; reprinted with permission ([email or web address].[email or web address]). All rights reserved.          Reviewed and updated as needed this visit by clinical staff   Tobacco  Allergies  Meds  Problems  Med Hx  Surg Hx  Fam Hx  Soc   Hx          Reviewed and updated as needed " this visit by Provider   Tobacco  Allergies  Meds  Problems  Med Hx  Surg Hx  Fam Hx           Social History     Tobacco Use     Smoking status: Former Smoker     Types: Cigarettes     Quit date: 1990     Years since quittin.2     Smokeless tobacco: Never Used   Substance Use Topics     Alcohol use: Yes     Comment: 2 glasses of wine per week         Alcohol Use 2021   Prescreen: >3 drinks/day or >7 drinks/week? No               Current providers sharing in care for this patient include:   Patient Care Team:  Rita Sloan APRN CNP as PCP - General (Nurse Practitioner)  Rita Sloan APRN CNP as Assigned PCP    The following health maintenance items are reviewed in Epic and correct as of today:  Health Maintenance   Topic Date Due     DEXA  Never done     ZOSTER IMMUNIZATION (2 of 3) 2006     EYE EXAM  10/25/2019     COVID-19 Vaccine (4 - Booster for Pfizer series) 2022     INFLUENZA VACCINE (1) 2022     BMP  2022     MEDICARE ANNUAL WELLNESS VISIT  10/06/2023     ANNUAL REVIEW OF HM ORDERS  10/06/2023     FALL RISK ASSESSMENT  10/06/2023     ADVANCE CARE PLANNING  10/06/2027     DTAP/TDAP/TD IMMUNIZATION (3 - Td or Tdap) 2030     PHQ-2 (once per calendar year)  Completed     Pneumococcal Vaccine: 65+ Years  Completed     IPV IMMUNIZATION  Aged Out     MENINGITIS IMMUNIZATION  Aged Out     HEPATITIS B IMMUNIZATION  Aged Out     Lab work is in process      Mammogram Screening - Mammography discussed, not appropriate due to age  Pertinent mammograms are reviewed under the imaging tab.    Review of Systems  Constitutional, HEENT, cardiovascular, pulmonary, gi and gu systems are negative, except as otherwise noted.    OBJECTIVE:   /78   Pulse 86   Temp 98  F (36.7  C) (Oral)   Resp 14   Ht 1.524 m (5')   Wt 51.9 kg (114 lb 8 oz)   LMP  (LMP Unknown)   SpO2 91%   Breastfeeding No   BMI 22.36 kg/m   Estimated body mass index is 22.36  kg/m  as calculated from the following:    Height as of this encounter: 1.524 m (5').    Weight as of this encounter: 51.9 kg (114 lb 8 oz).  Physical Exam  GENERAL: healthy, alert and no distress  NECK: no adenopathy, no asymmetry, masses, or scars and thyroid normal to palpation  RESP: lungs clear to auscultation - no rales, rhonchi or wheezes  CV: regular rate and rhythm, normal S1 S2, no S3 or S4, no murmur, click or rub, no peripheral edema and peripheral pulses strong  MS: no gross musculoskeletal defects noted, no edema    Diagnostic Test Results:  Labs reviewed in Epic  No results found for any visits on 10/06/22.    ASSESSMENT / PLAN:   (Z00.00) Encounter for Medicare annual wellness exam  (primary encounter diagnosis)  Comment: Stable. Will order labs. Previous labs assess from last year and noted to be stable overall. Patient compliant with medication with no side effects. Pt to receive COVID 19 booster and Flu shot during visit today. Counseling provided on hearing, exercise- gardening.   Plan: REVIEW OF HEALTH MAINTENANCE PROTOCOL ORDERS,         BASIC METABOLIC PANEL, COVID-19,PF,PFIZER         BOOSTER BIVALENT (12+YRS), INFLUENZA, QUAD,         HIGH DOSE, PF, 65YR + (FLUZONE HD), CBC with         platelets and differential        Pending labs. Pt will obtain shringx at pharmacy over the next week      Patient has been advised of split billing requirements and indicates understanding: Yes    COUNSELING:  Reviewed preventive health counseling, as reflected in patient instructions       Regular exercise       Healthy diet/nutrition       Hearing screening       Fall risk prevention    Estimated body mass index is 22.36 kg/m  as calculated from the following:    Height as of this encounter: 1.524 m (5').    Weight as of this encounter: 51.9 kg (114 lb 8 oz).        She reports that she quit smoking about 32 years ago. Her smoking use included cigarettes. She has never used smokeless  tobacco.      Appropriate preventive services were discussed with this patient, including applicable screening as appropriate for cardiovascular disease, diabetes, osteopenia/osteoporosis, and glaucoma.  As appropriate for age/gender, discussed screening for colorectal cancer, prostate cancer, breast cancer, and cervical cancer. Checklist reviewing preventive services available has been given to the patient.    Reviewed patients plan of care and provided an AVS. The Basic Care Plan (routine screening as documented in Health Maintenance) for Marisol meets the Care Plan requirement. This Care Plan has been established and reviewed with the Patient.    Counseling Resources:  ATP IV Guidelines  Pooled Cohorts Equation Calculator  Breast Cancer Risk Calculator  Breast Cancer: Medication to Reduce Risk  FRAX Risk Assessment  ICSI Preventive Guidelines  Dietary Guidelines for Americans, 2010  [employer]'s MyPlate  ASA Prophylaxis  Lung CA Screening    CODI REGAN MD  [facility]    Identified Health Risks:

## 2022-10-07 NOTE — TELEPHONE ENCOUNTER
Spoke with patient and informed her of lab result note as written by provider. Patient verbalized understanding and had no further questions.    Madiha Ballard RN  [facility]

## 2022-12-19 DIAGNOSIS — I10 HYPERTENSION, GOAL BELOW 150/90: ICD-10-CM

## 2022-12-20 RX ORDER — AMLODIPINE BESYLATE 5 MG/1
TABLET ORAL
Qty: 90 TABLET | Refills: 3 | Status: SHIPPED | OUTPATIENT
Start: 2022-12-20 | End: 2023-12-18

## 2022-12-20 RX ORDER — HYDROCHLOROTHIAZIDE 12.5 MG/1
TABLET ORAL
Qty: 90 TABLET | Refills: 3 | Status: SHIPPED | OUTPATIENT
Start: 2022-12-20 | End: 2023-12-18

## 2023-10-25 ENCOUNTER — OFFICE VISIT (OUTPATIENT)
Dept: FAMILY MEDICINE | Facility: CLINIC | Age: 88
End: 2023-10-25
Payer: COMMERCIAL

## 2023-10-25 VITALS
SYSTOLIC BLOOD PRESSURE: 118 MMHG | HEART RATE: 81 BPM | BODY MASS INDEX: 20.42 KG/M2 | WEIGHT: 104 LBS | TEMPERATURE: 97.4 F | RESPIRATION RATE: 14 BRPM | OXYGEN SATURATION: 92 % | DIASTOLIC BLOOD PRESSURE: 72 MMHG | HEIGHT: 60 IN

## 2023-10-25 DIAGNOSIS — Z23 HIGH PRIORITY FOR 2019-NCOV VACCINE: ICD-10-CM

## 2023-10-25 DIAGNOSIS — Z00.00 ENCOUNTER FOR MEDICARE ANNUAL WELLNESS EXAM: Primary | ICD-10-CM

## 2023-10-25 LAB
BASOPHILS # BLD AUTO: 0.1 10E3/UL (ref 0–0.2)
BASOPHILS NFR BLD AUTO: 1 %
EOSINOPHIL # BLD AUTO: 0.2 10E3/UL (ref 0–0.7)
EOSINOPHIL NFR BLD AUTO: 2 %
ERYTHROCYTE [DISTWIDTH] IN BLOOD BY AUTOMATED COUNT: 13.6 % (ref 10–15)
HCT VFR BLD AUTO: 46 % (ref 35–47)
HGB BLD-MCNC: 15.4 G/DL (ref 11.7–15.7)
IMM GRANULOCYTES # BLD: 0 10E3/UL
IMM GRANULOCYTES NFR BLD: 0 %
LYMPHOCYTES # BLD AUTO: 3.2 10E3/UL (ref 0.8–5.3)
LYMPHOCYTES NFR BLD AUTO: 40 %
MCH RBC QN AUTO: 31 PG (ref 26.5–33)
MCHC RBC AUTO-ENTMCNC: 33.5 G/DL (ref 31.5–36.5)
MCV RBC AUTO: 93 FL (ref 78–100)
MONOCYTES # BLD AUTO: 0.8 10E3/UL (ref 0–1.3)
MONOCYTES NFR BLD AUTO: 11 %
NEUTROPHILS # BLD AUTO: 3.7 10E3/UL (ref 1.6–8.3)
NEUTROPHILS NFR BLD AUTO: 46 %
PLATELET # BLD AUTO: 232 10E3/UL (ref 150–450)
RBC # BLD AUTO: 4.97 10E6/UL (ref 3.8–5.2)
WBC # BLD AUTO: 8 10E3/UL (ref 4–11)

## 2023-10-25 PROCEDURE — 80053 COMPREHEN METABOLIC PANEL: CPT | Performed by: STUDENT IN AN ORGANIZED HEALTH CARE EDUCATION/TRAINING PROGRAM

## 2023-10-25 PROCEDURE — 90480 ADMN SARSCOV2 VAC 1/ONLY CMP: CPT | Performed by: STUDENT IN AN ORGANIZED HEALTH CARE EDUCATION/TRAINING PROGRAM

## 2023-10-25 PROCEDURE — 91320 SARSCV2 VAC 30MCG TRS-SUC IM: CPT | Performed by: STUDENT IN AN ORGANIZED HEALTH CARE EDUCATION/TRAINING PROGRAM

## 2023-10-25 PROCEDURE — 36415 COLL VENOUS BLD VENIPUNCTURE: CPT | Performed by: STUDENT IN AN ORGANIZED HEALTH CARE EDUCATION/TRAINING PROGRAM

## 2023-10-25 PROCEDURE — 99397 PER PM REEVAL EST PAT 65+ YR: CPT | Mod: 25 | Performed by: STUDENT IN AN ORGANIZED HEALTH CARE EDUCATION/TRAINING PROGRAM

## 2023-10-25 PROCEDURE — 85025 COMPLETE CBC W/AUTO DIFF WBC: CPT | Performed by: STUDENT IN AN ORGANIZED HEALTH CARE EDUCATION/TRAINING PROGRAM

## 2023-10-25 ASSESSMENT — ACTIVITIES OF DAILY LIVING (ADL): CURRENT_FUNCTION: NO ASSISTANCE NEEDED

## 2023-10-25 NOTE — PROGRESS NOTES
SUBJECTIVE:   Marisol is a 89 year old who presents for Preventive Visit.      10/25/2023     2:57 PM   Additional Questions   Roomed by Luly       Are you in the first 12 months of your Medicare coverage?  No    Healthy Habits:     In general, how would you rate your overall health?  Very good    Frequency of exercise:  2-3 days/week    Duration of exercise:  30-45 minutes    Taking medications regularly:  Yes    Barriers to taking medications:  None    Medication side effects:  None    Ability to successfully perform activities of daily living:  No assistance needed    Home Safety:  No safety concerns identified    Hearing Impairment:  No hearing concerns    In the past 6 months, have you been bothered by leaking of urine? Yes    In general, how would you rate your overall mental or emotional health?  Good    Additional concerns today:  No          Have you ever done Advance Care Planning? (For example, a Health Directive, POLST, or a discussion with a medical provider or your loved ones about your wishes): Yes, advance care planning is on file.       Fall risk  Fallen 2 or more times in the past year?: No  Any fall with injury in the past year?: No    Cognitive Screening   1) Repeat 3 items (Leader, Season, Table)    2) Clock draw: NORMAL  3) 3 item recall: Recalls 3 objects  Results: 3 items recalled: COGNITIVE IMPAIRMENT LESS LIKELY    Mini-CogTM Copyright S Rick. Licensed by the author for use in [facility]; reprinted with permission ([email or web address].[email or web address]). All rights reserved.          Reviewed and updated as needed this visit by clinical staff   Tobacco  Allergies  Meds              Reviewed and updated as needed this visit by Provider                 Social History     Tobacco Use     Smoking status: Former     Types: Cigarettes     Quit date: 1990     Years since quittin.3     Smokeless tobacco: Never   Substance Use Topics     Alcohol use: Yes     Comment: 2 glasses of wine per week        Do you have a current opioid prescription? No  Do you use any other controlled substances or medications that are not prescribed by a provider? None              Current providers sharing in care for this patient include:   Patient Care Team:  Meghana Randall MD as PCP - General (Family Medicine)  Meghana Randall MD as Assigned PCP    The following health maintenance items are reviewed in Epic and correct as of today:  Health Maintenance   Topic Date Due     DEXA  Never done     EYE EXAM  10/25/2019     COVID-19 Vaccine (5 - 2023-24 season) 09/01/2023     BMP  10/06/2023     ANNUAL REVIEW OF HM ORDERS  10/06/2023     MEDICARE ANNUAL WELLNESS VISIT  10/06/2023     ZOSTER IMMUNIZATION (3 of 3) 11/21/2023     FALL RISK ASSESSMENT  10/25/2024     ADVANCE CARE PLANNING  10/25/2028     DTAP/TDAP/TD IMMUNIZATION (2 - Td or Tdap) 09/09/2030     PHQ-2 (once per calendar year)  Completed     INFLUENZA VACCINE  Completed     Pneumococcal Vaccine: 65+ Years  Completed     RSV VACCINE 60+  Completed     IPV IMMUNIZATION  Aged Out     HPV IMMUNIZATION  Aged Out     MENINGITIS IMMUNIZATION  Aged Out     Lab work is in process  Labs reviewed in EPIC      Mammogram Screening - Mammography discussed, not appropriate due to age  Pertinent mammograms are reviewed under the imaging tab.    Review of Systems  Constitutional, HEENT, cardiovascular, pulmonary, gi and gu systems are negative, except as otherwise noted.    OBJECTIVE:   /72   Pulse 81   Temp 97.4  F (36.3  C) (Temporal)   Resp 14   Ht 1.524 m (5')   Wt 47.2 kg (104 lb)   LMP  (LMP Unknown)   SpO2 92%   BMI 20.31 kg/m   Estimated body mass index is 20.31 kg/m  as calculated from the following:    Height as of this encounter: 1.524 m (5').    Weight as of this encounter: 47.2 kg (104 lb).  Physical Exam  GENERAL: healthy, alert and no distress  EYES: Eyes grossly normal to inspection, PERRL and conjunctivae and sclerae normal  Neck: No visible JVD or  lymphadenopathy   RESP: symmetrical rise in chest   CV: No peripheral edema notable   MS: no gross musculoskeletal defects noted  SKIN: no suspicious lesions or rashes  PSYCH: mentation appears normal, affect normal/bright      Diagnostic Test Results:  Labs reviewed in Epic  Results for orders placed or performed in visit on 10/25/23   CBC with platelets and differential     Status: None (In process)    Narrative    The following orders were created for panel order CBC with platelets and differential.  Procedure                               Abnormality         Status                     ---------                               -----------         ------                     CBC with platelets and d...[[id number]]                      In process                   Please view results for these tests on the individual orders.       ASSESSMENT / PLAN:   (Z00.00) Encounter for Medicare annual wellness exam  (primary encounter diagnosis)  Comment: Stable  Plan: REVIEW OF HEALTH MAINTENANCE PROTOCOL ORDERS,         Comprehensive metabolic panel (BMP + Alb, Alk         Phos, ALT, AST, Total. Bili, TP), CBC with         platelets and differential            (Z23) High priority for 2019-nCoV vaccine  Comment: Stable  Plan: COVID-19 12+ (2023-24) (PFIZER)                Patient has been advised of split billing requirements and indicates understanding: Yes      COUNSELING:  Reviewed preventive health counseling, as reflected in patient instructions        She reports that she quit smoking about 33 years ago. Her smoking use included cigarettes. She has never used smokeless tobacco.      Appropriate preventive services were discussed with this patient, including applicable screening as appropriate for fall prevention, nutrition, physical activity, Tobacco-use cessation, weight loss and cognition.  Checklist reviewing preventive services available has been given to the patient.    Reviewed patients plan of care and provided an  AVS. The Basic Care Plan (routine screening as documented in Health Maintenance) for Marisol meets the Care Plan requirement. This Care Plan has been established and reviewed with the Patient.          CODI REGAN MD  [facility]    Identified Health Risks:  I have reviewed Opioid Use Disorder and Substance Use Disorder risk factors and made any needed referrals.

## 2023-10-25 NOTE — LETTER
October 26, 2023    Marisol Steele  [address] [place] [address]          Dear ,    We are writing to inform you of your test results.    Your results are overall not concerning.       Resulted Orders   Comprehensive metabolic panel (BMP + Alb, Alk Phos, ALT, AST, Total. Bili, TP)   Result Value Ref Range    Sodium 138 135 - 145 mmol/L      Comment:      Reference intervals for this test were updated on 09/26/2023 to more accurately reflect our healthy population. There may be differences in the flagging of prior results with similar values performed with this method. Interpretation of those prior results can be made in the context of the updated reference intervals.     Potassium 3.2 (L) 3.4 - 5.3 mmol/L    Carbon Dioxide (CO2) 27 22 - 29 mmol/L    Anion Gap 12 7 - 15 mmol/L    Urea Nitrogen 12.4 8.0 - 23.0 mg/dL    Creatinine 0.76 0.51 - 0.95 mg/dL    GFR Estimate 71 >60 mL/min/1.73m2    Calcium 9.4 8.2 - 9.6 mg/dL    Chloride 99 98 - 107 mmol/L    Glucose 90 70 - 99 mg/dL    Alkaline Phosphatase 44 35 - 104 U/L    AST 26 0 - 45 U/L      Comment:      Reference intervals for this test were updated on 6/12/2023 to more accurately reflect our healthy population. There may be differences in the flagging of prior results with similar values performed with this method. Interpretation of those prior results can be made in the context of the updated reference intervals.    ALT 8 0 - 50 U/L      Comment:      Reference intervals for this test were updated on 6/12/2023 to more accurately reflect our healthy population. There may be differences in the flagging of prior results with similar values performed with this method. Interpretation of those prior results can be made in the context of the updated reference intervals.      Protein Total 7.5 6.4 - 8.3 g/dL    Albumin 3.9 3.5 - 5.2 g/dL    Bilirubin Total 0.9 <=1.2 mg/dL   CBC with platelets and differential   Result Value Ref Range    WBC Count 8.0  4.0 - 11.0 10e3/uL    RBC Count 4.97 3.80 - 5.20 10e6/uL    Hemoglobin 15.4 11.7 - 15.7 g/dL    Hematocrit 46.0 35.0 - 47.0 %    MCV 93 78 - 100 fL    MCH 31.0 26.5 - 33.0 pg    MCHC 33.5 31.5 - 36.5 g/dL    RDW 13.6 10.0 - 15.0 %    Platelet Count 232 150 - 450 10e3/uL    % Neutrophils 46 %    % Lymphocytes 40 %    % Monocytes 11 %    % Eosinophils 2 %    % Basophils 1 %    % Immature Granulocytes 0 %    Absolute Neutrophils 3.7 1.6 - 8.3 10e3/uL    Absolute Lymphocytes 3.2 0.8 - 5.3 10e3/uL    Absolute Monocytes 0.8 0.0 - 1.3 10e3/uL    Absolute Eosinophils 0.2 0.0 - 0.7 10e3/uL    Absolute Basophils 0.1 0.0 - 0.2 10e3/uL    Absolute Immature Granulocytes 0.0 <=0.4 10e3/uL       If you have any questions or concerns, please call the clinic at the number listed above.       Sincerely,      Remigio Sullivan MD

## 2023-10-25 NOTE — PATIENT INSTRUCTIONS
Patient Education   Personalized Prevention Plan  You are due for the preventive services outlined below.  Your care team is available to assist you in scheduling these services.  If you have already completed any of these items, please share that information with your care team to update in your medical record.  Health Maintenance Due   Topic Date Due     Osteoporosis Screening  Never done     Eye Exam  10/25/2019     COVID-19 Vaccine (5 - 2023-24 season) 09/01/2023     Basic Metabolic Panel  10/06/2023     ANNUAL REVIEW OF HM ORDERS  10/06/2023     Annual Wellness Visit  10/06/2023     Bladder Training: Care Instructions  Your Care Instructions     Bladder training is used to treat urge incontinence and stress incontinence. Urge incontinence means that the need to urinate comes on so fast that you can't get to a toilet in time. Stress incontinence means that you leak urine because of pressure on your bladder. For example, it may happen when you laugh, cough, or lift something heavy.  Bladder training can increase how long you can wait before you have to urinate. It can also help your bladder hold more urine. And it can give you better control over the urge to urinate.  It is important to remember that bladder training takes a few weeks to a few months to make a difference. You may not see results right away, but don't give up.  Follow-up care is a key part of your treatment and safety. Be sure to make and go to all appointments, and call your doctor if you are having problems. It's also a good idea to know your test results and keep a list of the medicines you take.  How can you care for yourself at home?  Work with your doctor to come up with a bladder training program that is right for you. You may use one or more of the following methods.  Delayed urination  In the beginning, try to keep from urinating for 5 minutes after you first feel the need to go.  While you wait, take deep, slow breaths to relax. Jalen  "exercises can also help you delay the need to go to the bathroom.  After some practice, when you can easily wait 5 minutes to urinate, try to wait 10 minutes before you urinate.  Slowly increase the waiting period until you are able to control when you have to urinate.  Scheduled urination  Empty your bladder when you first wake up in the morning.  Schedule times throughout the day when you will urinate.  Start by going to the bathroom every hour, even if you don't need to go.  Slowly increase the time between trips to the bathroom.  When you have found a schedule that works well for you, keep doing it.  If you wake up during the night and have to urinate, do it. Apply your schedule to waking hours only.  Kegel exercises  These tighten and strengthen pelvic muscles, which can help you control the flow of urine. (If doing these exercises causes pain, stop doing them and talk with your doctor.) To do Kegel exercises:  Squeeze your muscles as if you were trying not to pass gas. Or squeeze your muscles as if you were stopping the flow of urine. Your belly, legs, and buttocks shouldn't move.  Hold the squeeze for 3 seconds, then relax for 5 to 10 seconds.  Start with 3 seconds, then add 1 second each week until you are able to squeeze for 10 seconds.  Repeat the exercise 10 times a session. Do 3 to 8 sessions a day.  When should you call for help?  Watch closely for changes in your health, and be sure to contact your doctor if:    Your incontinence is getting worse.     You do not get better as expected.   Where can you learn more?  Go to https://www.????.net/patiented  Enter V684 in the search box to learn more about \"Bladder Training: Care Instructions.\"  Current as of: March 1, 2023               Content Version: 13.7    7377-7846 [employer].   Care instructions adapted under license by your healthcare professional. If you have questions about a medical condition or this instruction, always ask " your healthcare professional. [employer] disclaims any warranty or liability for your use of this information.

## 2023-10-26 LAB
ALBUMIN SERPL BCG-MCNC: 3.9 G/DL (ref 3.5–5.2)
ALP SERPL-CCNC: 44 U/L (ref 35–104)
ALT SERPL W P-5'-P-CCNC: 8 U/L (ref 0–50)
ANION GAP SERPL CALCULATED.3IONS-SCNC: 12 MMOL/L (ref 7–15)
AST SERPL W P-5'-P-CCNC: 26 U/L (ref 0–45)
BILIRUB SERPL-MCNC: 0.9 MG/DL
BUN SERPL-MCNC: 12.4 MG/DL (ref 8–23)
CALCIUM SERPL-MCNC: 9.4 MG/DL (ref 8.2–9.6)
CHLORIDE SERPL-SCNC: 99 MMOL/L (ref 98–107)
CREAT SERPL-MCNC: 0.76 MG/DL (ref 0.51–0.95)
DEPRECATED HCO3 PLAS-SCNC: 27 MMOL/L (ref 22–29)
EGFRCR SERPLBLD CKD-EPI 2021: 71 ML/MIN/1.73M2
GLUCOSE SERPL-MCNC: 90 MG/DL (ref 70–99)
POTASSIUM SERPL-SCNC: 3.2 MMOL/L (ref 3.4–5.3)
PROT SERPL-MCNC: 7.5 G/DL (ref 6.4–8.3)
SODIUM SERPL-SCNC: 138 MMOL/L (ref 135–145)

## 2024-03-18 DIAGNOSIS — I10 HYPERTENSION, GOAL BELOW 150/90: ICD-10-CM

## 2024-03-18 RX ORDER — HYDROCHLOROTHIAZIDE 12.5 MG/1
TABLET ORAL
Qty: 90 TABLET | Refills: 1 | Status: SHIPPED | OUTPATIENT
Start: 2024-03-18 | End: 2024-09-16

## 2024-03-18 RX ORDER — AMLODIPINE BESYLATE 5 MG/1
TABLET ORAL
Qty: 90 TABLET | Refills: 1 | Status: SHIPPED | OUTPATIENT
Start: 2024-03-18 | End: 2024-09-16

## 2024-04-22 ENCOUNTER — OFFICE VISIT (OUTPATIENT)
Dept: OPHTHALMOLOGY | Facility: CLINIC | Age: 89
End: 2024-04-22
Payer: COMMERCIAL

## 2024-04-22 DIAGNOSIS — H35.3211 EXUDATIVE AGE-RELATED MACULAR DEGENERATION OF RIGHT EYE WITH ACTIVE CHOROIDAL NEOVASCULARIZATION (H): ICD-10-CM

## 2024-04-22 DIAGNOSIS — Z01.01 ENCOUNTER FOR EXAMINATION OF EYES AND VISION WITH ABNORMAL FINDINGS: Primary | ICD-10-CM

## 2024-04-22 DIAGNOSIS — Z96.1 PSEUDOPHAKIA: ICD-10-CM

## 2024-04-22 DIAGNOSIS — H52.4 PRESBYOPIA: ICD-10-CM

## 2024-04-22 DIAGNOSIS — H43.812 POSTERIOR VITREOUS DETACHMENT, LEFT EYE: ICD-10-CM

## 2024-04-22 PROCEDURE — 92004 COMPRE OPH EXAM NEW PT 1/>: CPT | Performed by: OPHTHALMOLOGY

## 2024-04-22 PROCEDURE — 92015 DETERMINE REFRACTIVE STATE: CPT | Performed by: OPHTHALMOLOGY

## 2024-04-22 PROCEDURE — 92134 CPTRZ OPH DX IMG PST SGM RTA: CPT | Performed by: OPHTHALMOLOGY

## 2024-04-22 ASSESSMENT — CONF VISUAL FIELD
OD_INFERIOR_TEMPORAL_RESTRICTION: 0
OD_SUPERIOR_NASAL_RESTRICTION: 0
METHOD: COUNTING FINGERS
OD_NORMAL: 1
OS_SUPERIOR_NASAL_RESTRICTION: 0
OS_SUPERIOR_TEMPORAL_RESTRICTION: 0
OS_INFERIOR_TEMPORAL_RESTRICTION: 0
OS_INFERIOR_NASAL_RESTRICTION: 0
OD_SUPERIOR_TEMPORAL_RESTRICTION: 0
OS_NORMAL: 1
OD_INFERIOR_NASAL_RESTRICTION: 0

## 2024-04-22 ASSESSMENT — VISUAL ACUITY
OS_CC: 20/50
OD_CC: 20/400
METHOD: SNELLEN - LINEAR
OS_CC+: -2
CORRECTION_TYPE: GLASSES
OS_PH_CC: 20/40

## 2024-04-22 ASSESSMENT — REFRACTION_WEARINGRX
OS_SPHERE: -1.00
OS_AXIS: 158
OD_SPHERE: -0.50
OS_CYLINDER: +0.75
SPECS_TYPE: BIFOCAL
OD_CYLINDER: +1.00
OS_ADD: +3.00
OD_AXIS: 175
OD_ADD: +3.00

## 2024-04-22 ASSESSMENT — EXTERNAL EXAM - RIGHT EYE: OD_EXAM: PROLAPSED FAT PADS: UPPER, LOWER

## 2024-04-22 ASSESSMENT — REFRACTION_MANIFEST
OD_CYLINDER: +0.75
OS_AXIS: 163
OD_ADD: +3.00
OS_ADD: +3.00
OS_SPHERE: -1.25
OS_CYLINDER: +0.50
OD_AXIS: 170
OD_SPHERE: -1.00

## 2024-04-22 ASSESSMENT — CUP TO DISC RATIO
OS_RATIO: 0.2
OD_RATIO: 0.4

## 2024-04-22 ASSESSMENT — EXTERNAL EXAM - LEFT EYE: OS_EXAM: PROLAPSED FAT PADS: UPPER, LOWER

## 2024-04-22 ASSESSMENT — TONOMETRY
OD_IOP_MMHG: 19
IOP_METHOD: APPLANATION
OS_IOP_MMHG: 20

## 2024-04-22 ASSESSMENT — SLIT LAMP EXAM - LIDS
COMMENTS: 2+ DERMATOCHALASIS
COMMENTS: 2+ DERMATOCHALASIS

## 2024-04-22 NOTE — PROGRESS NOTES
" Current Eye Medications:  refresh - both eyes QD     Subjective:  comprehensive eye exam - unsure of last eye exam. Has more trouble with focusing at distance. No issues with near vision. Eyes get itchy and teary, finds relief with refresh.    Thinks decreased vision right eye relatively recent.  Drives.  Last exam with us in 2018: [facility] 20/30 both eyes.     Objective:  See Ophthalmology Exam.       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      ICD-10-CM    1. Encounter for examination of eyes and vision with abnormal findings  Z01.01       2. Presbyopia  H52.4       3. 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 Pseudophakia, ou  Z96.1       5. Posterior vitreous detachment, left eye  H43.812             Plan:  Glasses prescription given - optional    May use artificial tears up to four times a day (like Refresh Optive, Systane Balance, or TheraTears. Avoid \"get the red out\" drops and generic artifical tears).     Referral sent to the [facility], their office will contact you to set up an appointment    Call in December 2024 for an appointment in April 2025 for Complete Exam    Dr. Meléndez [phone]       "

## 2024-04-22 NOTE — PATIENT INSTRUCTIONS
"Glasses prescription given - optional    May use artificial tears up to four times a day (like Refresh Optive, Systane Balance, or TheraTears. Avoid \"get the red out\" drops and generic artifical tears).     Referral sent to the [facility] [place], their office will contact you to set up an appointment    Call in December 2024 for an appointment in April 2025 for Complete Exam    Dr. Meléndez [phone]    "
Alert and oriented to person, place and time

## 2024-04-22 NOTE — LETTER
"    4/22/2024         RE: Marisol Steele  [address] [place] [address]        Dear Colleague,    Thank you for referring your patient, Marisol Steele, to the [facility]. Please see a copy of my visit note below.     Current Eye Medications:  refresh - both eyes QD     Subjective:  comprehensive eye exam - unsure of last eye exam. Has more trouble with focusing at distance. No issues with near vision. Eyes get itchy and teary, finds relief with refresh.    Thinks decreased vision right eye relatively recent.  Drives.  Last exam with us in 2018: [facility] 20/30 both eyes.     Objective:  See Ophthalmology Exam.       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      ICD-10-CM    1. Encounter for examination of eyes and vision with abnormal findings  Z01.01       2. Presbyopia  H52.4       3. Exudative age-related macular degeneration of right eye with active choroidal neovascularization (H)  H35.3211       4. Pseudophakia, ou  Z96.1       5. Posterior vitreous detachment, left eye  H43.812             Plan:  Glasses prescription given - optional    May use artificial tears up to four times a day (like Refresh Optive, Systane Balance, or TheraTears. Avoid \"get the red out\" drops and generic artifical tears).     Referral sent to the [facility], their office will contact you to set up an appointment    Call in December 2024 for an appointment in April 2025 for Complete Exam    Dr. Meléndez [phone]           Again, thank you for allowing me to participate in the care of your patient.        Sincerely,        Jakob Meléndez MD  "

## 2024-09-16 DIAGNOSIS — I10 HYPERTENSION, GOAL BELOW 150/90: ICD-10-CM

## 2024-09-16 RX ORDER — AMLODIPINE BESYLATE 5 MG/1
TABLET ORAL
Qty: 90 TABLET | Refills: 0 | Status: SHIPPED | OUTPATIENT
Start: 2024-09-16

## 2024-09-16 RX ORDER — HYDROCHLOROTHIAZIDE 12.5 MG/1
TABLET ORAL
Qty: 90 TABLET | Refills: 0 | Status: SHIPPED | OUTPATIENT
Start: 2024-09-16

## 2024-09-27 ENCOUNTER — TELEPHONE (OUTPATIENT)
Dept: FAMILY MEDICINE | Facility: CLINIC | Age: 89
End: 2024-09-27
Payer: COMMERCIAL

## 2024-09-27 NOTE — TELEPHONE ENCOUNTER
Reason for Call:  Appointment Request    Patient requesting this type of appt:  Preventive     Requested provider: Meghana Deleon    Reason patient unable to be scheduled: Not within requested timeframe    When does patient want to be seen/preferred time:  next 30 days    Comments: Pt took next available appt, but not [facility]. Pt would like to be seen sooner. Please call with available optioins    Okay to leave a detailed message?: Yes at Home number on file [phone] (home)    Call taken on 9/27/2024 at 9:27 AM by Cecilia Thomas

## 2024-10-02 NOTE — TELEPHONE ENCOUNTER
Called and scheduled patient for Friday 11/22/2024 and informed 6:45am check in. Jennifer Cabrales MA

## 2024-11-25 ENCOUNTER — TELEPHONE (OUTPATIENT)
Dept: FAMILY MEDICINE | Facility: CLINIC | Age: 89
End: 2024-11-25
Payer: COMMERCIAL

## 2024-11-25 NOTE — LETTER
November 27, 2024      Marisol Steele  [address] [place] [address]        Dear ,    We are writing to inform you of your test results. Per your provider your cholesterol is doing great and keep up the good work. Your provider also stated that you kidney panel is stable.     Component      Latest Ref Rng 11/22/2024  7:29 AM   WBC      4.0 - 11.0 10e3/uL 7.9    RBC Count      3.80 - 5.20 10e6/uL 5.00    Hemoglobin      11.7 - 15.7 g/dL 15.6    Hematocrit      35.0 - 47.0 % 47.0    MCV      78 - 100 fL 94    MCH      26.5 - 33.0 pg 31.2    MCHC      31.5 - 36.5 g/dL 33.2    RDW      10.0 - 15.0 % 13.9    Platelet Count      150 - 450 10e3/uL 249    % Neutrophils      % 60    % Lymphocytes      % 28    % Monocytes      % 9    % Eosinophils      % 1    % Basophils      % 1    % Immature Granulocytes      % 0    Absolute Neutrophils      1.6 - 8.3 10e3/uL 4.7    Absolute Lymphocytes      0.8 - 5.3 10e3/uL 2.2    Absolute Monocytes      0.0 - 1.3 10e3/uL 0.7    Absolute Eosinophils      0.0 - 0.7 10e3/uL 0.1    Absolute Basophils      0.0 - 0.2 10e3/uL 0.1    Absolute Immature Granulocytes      <=0.4 10e3/uL 0.0    Sodium      135 - 145 mmol/L 139    Potassium      3.4 - 5.3 mmol/L 3.7    Chloride      98 - 107 mmol/L 102    Carbon Dioxide (CO2)      22 - 29 mmol/L 28    Anion Gap      7 - 15 mmol/L 9    Urea Nitrogen      8.0 - 23.0 mg/dL 15.3    Creatinine      0.51 - 0.95 mg/dL 0.80    GFR Estimate      >60 mL/min/1.73m2 66    Calcium      8.8 - 10.4 mg/dL 9.7    Glucose      70 - 99 mg/dL 103 (H)    Patient Fasting? Yes    Patient Fasting? Yes    Cholesterol      <200 mg/dL 202 (H)    Triglycerides      <150 mg/dL 89    HDL Cholesterol      >=50 mg/dL 71    LDL Cholesterol Calculated      <100 mg/dL 113 (H)    Non HDL Cholesterol      <130 mg/dL 131 (H)       Please call the clinic with any additional questions.       Thank you for choosing us your partner in health care.     Your  [facility] [place]  Care Team

## 2024-11-25 NOTE — TELEPHONE ENCOUNTER
Left message on voicemail advising patient to return call at [phone].    Please call pt and inform her that her cholesterol is doing great and to keep up the great work. Her kidney panel is also stable     Thanks     PATRICIA ASHLEY, RN  [facility], [place]

## 2024-11-27 NOTE — TELEPHONE ENCOUNTER
Team    Letter cued with provider message. Please print and mail to address on file.     Thanks,  DARIEL Castro  [facility]

## 2025-04-24 NOTE — PATIENT INSTRUCTIONS
Caller: Chelsea Ordaz (SUMA)    Relationship to patient: Emergency Contact      Best call back number:   Telephone Information:   Mobile [phone]     Provider: DR. URIBE     [address] [phone] [place] [phone]    Start taking the antibiotic today. Take 2 tablets today and 1 tablet daily for the following 4 days  You should notice an improvement within 24-48 hours  If you do not notice an improvement, or if you are getting worse, you need to be seen right away, whether at an emergency room or urgent care over the weekend  If you are feeling worse, and you are short of breath you should go to an emergency room. Have your son drive you or call 911    Go home and rest and drink plenty of fluids    If you are feeling much better by next week then it is ok to not follow up  If you are only feeling a little bit better by Monday, then please call clinic to schedule a visit with one of my partners

## 2025-07-22 ENCOUNTER — OFFICE VISIT (OUTPATIENT)
Dept: FAMILY MEDICINE | Facility: CLINIC | Age: OVER 89
End: 2025-07-22
Payer: COMMERCIAL

## 2025-07-22 VITALS
SYSTOLIC BLOOD PRESSURE: 161 MMHG | WEIGHT: 108.6 LBS | TEMPERATURE: 98.4 F | DIASTOLIC BLOOD PRESSURE: 73 MMHG | BODY MASS INDEX: 21.89 KG/M2 | OXYGEN SATURATION: 94 % | HEIGHT: 59 IN | HEART RATE: 85 BPM | RESPIRATION RATE: 18 BRPM

## 2025-07-22 DIAGNOSIS — M54.6 ACUTE RIGHT-SIDED THORACIC BACK PAIN: ICD-10-CM

## 2025-07-22 DIAGNOSIS — Z09 HOSPITAL DISCHARGE FOLLOW-UP: ICD-10-CM

## 2025-07-22 DIAGNOSIS — H35.3211 EXUDATIVE AGE-RELATED MACULAR DEGENERATION OF RIGHT EYE WITH ACTIVE CHOROIDAL NEOVASCULARIZATION (H): Primary | ICD-10-CM

## 2025-07-22 PROCEDURE — 3078F DIAST BP <80 MM HG: CPT | Performed by: STUDENT IN AN ORGANIZED HEALTH CARE EDUCATION/TRAINING PROGRAM

## 2025-07-22 PROCEDURE — 99214 OFFICE O/P EST MOD 30 MIN: CPT | Performed by: STUDENT IN AN ORGANIZED HEALTH CARE EDUCATION/TRAINING PROGRAM

## 2025-07-22 PROCEDURE — 3077F SYST BP >= 140 MM HG: CPT | Performed by: STUDENT IN AN ORGANIZED HEALTH CARE EDUCATION/TRAINING PROGRAM

## 2025-07-22 NOTE — PROGRESS NOTES
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 Please note that while the AI is designed to assist in capturing detailed information, there may be errors in the dictation. I will review and edit the content for accuracy before finalizing.      The longitudinal plan of care for the diagnosis(es)/condition(s) as documented were addressed during this visit. Due to the added complexity in care, I will continue to support Marisol in the subsequent management and with ongoing continuity of care.    MED REC REQUIRED  Post Medication Reconciliation Status:  Discharge medications reconciled, continue medications without change      Subjective   Marisol is a 89 year old, presenting for the following health issues:  Hospital F/U    HPI    History of Present Illness    Marisol Steele, 89 years    Right-sided torso pain  Reported back pain that was actually located on the right side of the torso, described as pulsating. The pain persisted for approximately 10 days before seeking care. Pain resolved within a couple of days after receiving a pain medication. No further details on the pain medication were recalled. Currently feeling fine with no ongoing pain.    Macular degeneration  History of macular degeneration. Receiving intravitreal injections in one eye, with initiation of injections in the left eye last month. Described the injections as not painful, despite initial apprehension about the procedure. No other symptoms related to vision or eyes  reported.    [facility]  Upcoming 89th birthday on August 8, 2025. Has grandchildren and great-grandchildren. No other symptoms or concerns reported.           ED/UC Followup:    Facility:  [facility]  Date of visit: 6/29/2025  Reason for visit: Back Pain   Current Status: Improved       ROS: 10 point ROS neg other than the symptoms noted above in the HPI.        Objective    LMP  (LMP Unknown)   There is no height or weight on file to calculate BMI.  Physical Exam   GENERAL: healthy, alert and no distress  EYES: Eyes grossly normal to inspection, PERRL and conjunctivae and sclerae normal  Neck: No visible JVD or lymphadenopathy   RESP: symmetrical rise in chest   CV: No peripheral edema notable   MS: no gross musculoskeletal defects noted  SKIN: no suspicious lesions or rashes  PSYCH: mentation appears normal, affect normal/bright              Signed Electronically by: CODI LOUIS MD